# Patient Record
Sex: FEMALE | Race: WHITE | NOT HISPANIC OR LATINO | Employment: FULL TIME | URBAN - METROPOLITAN AREA
[De-identification: names, ages, dates, MRNs, and addresses within clinical notes are randomized per-mention and may not be internally consistent; named-entity substitution may affect disease eponyms.]

---

## 2019-01-16 ENCOUNTER — TRANSFERRED RECORDS (OUTPATIENT)
Dept: HEALTH INFORMATION MANAGEMENT | Facility: CLINIC | Age: 29
End: 2019-01-16

## 2020-08-26 ENCOUNTER — TRANSFERRED RECORDS (OUTPATIENT)
Dept: HEALTH INFORMATION MANAGEMENT | Facility: CLINIC | Age: 30
End: 2020-08-26

## 2021-02-21 ENCOUNTER — TRANSFERRED RECORDS (OUTPATIENT)
Dept: HEALTH INFORMATION MANAGEMENT | Facility: CLINIC | Age: 31
End: 2021-02-21

## 2021-09-13 ENCOUNTER — TELEPHONE (OUTPATIENT)
Dept: NEUROSURGERY | Facility: CLINIC | Age: 31
End: 2021-09-13

## 2021-09-13 NOTE — TELEPHONE ENCOUNTER
[facility] Call [facility]    Phone Message    May a detailed message be left on voicemail: yes     Reason for Call: Other: Patient is requesting a call back to get assistance with scheduling an appointment  for sural, please call patient at [phone] to assist.     Action Taken: Message routed to:  [facility]    Travel Screening: Not Applicable

## 2021-09-22 NOTE — TELEPHONE ENCOUNTER
[facility] Call [facility]    Phone Message    May a detailed message be left on voicemail: yes     Reason for Call: Appointment Intake    Referring Provider Name: None - Self  Diagnosis and/or Symptoms: Supraorbital trigeninal    Action Taken: Message routed to:  [facility] Neurosurgery    Travel Screening: Not Applicable

## 2021-09-27 ENCOUNTER — PRE VISIT (OUTPATIENT)
Dept: NEUROSURGERY | Facility: CLINIC | Age: 31
End: 2021-09-27

## 2021-09-27 NOTE — TELEPHONE ENCOUNTER
RECORDS RECEIVED FROM: Self   REASON FOR VISIT: Supraorbital trigeninal   Date of Appt: TBD   NOTES (FOR ALL VISITS) STATUS DETAILS   OFFICE NOTE from referring provider N/A    OFFICE NOTE from other specialist Received Dr Reza @ [facility]:  8/26/20 4/20/20 2/21/19 1/30/19 1/4/19   DISCHARGE SUMMARY from hospital N/A    DISCHARGE REPORT from the ER N/A    OPERATIVE REPORT N/A    MEDICATION LIST Received    IMAGING  (FOR ALL VISITS)     EMG N/A    EEG N/A    LUMBAR PUNCTURE N/A    GENARO SCAN N/A    ULTRASOUND (CAROTID BILAT) *VASCULAR* N/A    MRI (HEAD, NECK, SPINE) Received [facility]:  MRI Brain  1/16/19  MRA COW 1/16/19  MRI Cervical Spine 1/16/19  MRV Brain 1/16/19  MRA Carotids 1/16/19   CT (HEAD, NECK, SPINE) N/A       Action 9/27/21 MV 7.16am   Action Taken Imaging request faxed to [facility] for:  MRI Brain  1/16/19  MRA COW Carotid 1/16/19  MRI Cervical Spine 1/16/19    --9/30/21 MV 10.30am--  Images resolved in PACS

## 2021-09-27 NOTE — TELEPHONE ENCOUNTER
[facility] Call Center    Phone Message    May a detailed message be left on voicemail: yes     Reason for Call: Other: Lienna is requesting a call back to discuss the status of scheduling of an appointment. Please all Lienna at your earliest convenience to discuss.     Action Taken: Message routed to:  [facility]    Travel Screening: Not Applicable

## 2021-09-28 NOTE — TELEPHONE ENCOUNTER
Writer ARMIN for pt explaining that records and imaging have been requested and once those are received we will call to [place] Treviño

## 2021-10-01 NOTE — TELEPHONE ENCOUNTER
Writer spoke with pt and schedule an In person visit with Dr. Oliva for 11/9 and added pt to wait list    Georgina Treviño

## 2021-11-09 ENCOUNTER — LAB (OUTPATIENT)
Dept: LAB | Facility: CLINIC | Age: 31
End: 2021-11-09
Payer: COMMERCIAL

## 2021-11-09 ENCOUNTER — OFFICE VISIT (OUTPATIENT)
Dept: NEUROSURGERY | Facility: CLINIC | Age: 31
End: 2021-11-09
Payer: COMMERCIAL

## 2021-11-09 VITALS
HEIGHT: 67 IN | OXYGEN SATURATION: 97 % | DIASTOLIC BLOOD PRESSURE: 78 MMHG | RESPIRATION RATE: 16 BRPM | HEART RATE: 68 BPM | BODY MASS INDEX: 30.45 KG/M2 | SYSTOLIC BLOOD PRESSURE: 112 MMHG | WEIGHT: 194 LBS

## 2021-11-09 DIAGNOSIS — R51.9 FACIAL PAIN: Primary | ICD-10-CM

## 2021-11-09 DIAGNOSIS — R51.9 FACIAL PAIN: ICD-10-CM

## 2021-11-09 PROCEDURE — 86431 RHEUMATOID FACTOR QUANT: CPT | Mod: 90 | Performed by: PATHOLOGY

## 2021-11-09 PROCEDURE — 86039 ANTINUCLEAR ANTIBODIES (ANA): CPT | Mod: 90 | Performed by: PATHOLOGY

## 2021-11-09 PROCEDURE — 82164 ANGIOTENSIN I ENZYME TEST: CPT | Mod: 90 | Performed by: PATHOLOGY

## 2021-11-09 PROCEDURE — 99204 OFFICE O/P NEW MOD 45 MIN: CPT | Performed by: NEUROLOGICAL SURGERY

## 2021-11-09 PROCEDURE — 36415 COLL VENOUS BLD VENIPUNCTURE: CPT | Performed by: PATHOLOGY

## 2021-11-09 PROCEDURE — 86038 ANTINUCLEAR ANTIBODIES: CPT | Mod: 90 | Performed by: PATHOLOGY

## 2021-11-09 PROCEDURE — 86235 NUCLEAR ANTIGEN ANTIBODY: CPT | Mod: 90 | Performed by: PATHOLOGY

## 2021-11-09 PROCEDURE — 99000 SPECIMEN HANDLING OFFICE-LAB: CPT | Performed by: PATHOLOGY

## 2021-11-09 PROCEDURE — 86200 CCP ANTIBODY: CPT | Mod: 90 | Performed by: PATHOLOGY

## 2021-11-09 RX ORDER — CITALOPRAM HYDROBROMIDE 10 MG/1
10 TABLET ORAL DAILY
Qty: 30 TABLET | Refills: 3 | Status: SHIPPED | OUTPATIENT
Start: 2021-11-09 | End: 2022-07-19

## 2021-11-09 RX ORDER — GABAPENTIN 600 MG/1
1200 TABLET ORAL 4 TIMES DAILY
COMMUNITY
End: 2021-11-09

## 2021-11-09 RX ORDER — GABAPENTIN 600 MG/1
1200 TABLET ORAL 4 TIMES DAILY
Qty: 240 TABLET | Refills: 0 | Status: SHIPPED | OUTPATIENT
Start: 2021-11-09 | End: 2021-12-09

## 2021-11-09 ASSESSMENT — PAIN SCALES - GENERAL: PAINLEVEL: NO PAIN (0)

## 2021-11-09 ASSESSMENT — MIFFLIN-ST. JEOR: SCORE: 1609.67

## 2021-11-09 NOTE — LETTER
11/9/2021       RE: Wade Pozo  [address]  [place] [address]     Dear Colleague,    Thank you for referring your patient, Wade Pozo, to the [facility]UROSURGERY [facility]IC [place] at [facility]. Please see a copy of my visit note below.    Neurosurgery Clinic Note    Reason for Visit: facial pain    History of Present Illness  Wade Pozo is a 31-year-old woman with a past medical history of ADHD who began having facial pain a few years ago.  She states that it started as just an unusual sensation and in one of her eyebrows and groove to the contralateral side over a period of 2 years.  At the time she thought that this is most likely due to an extremely stressful job where she also had significant changes in her menstrual cycle.  She eventually quit this year and got pregnant.  Back in 2018 she was referred to neurologist for MRI which did not reveal any masses or signs of multiple sclerosis.  She was started on gabapentin which did significantly help her.  She started that 300 3 times daily but has needed escalating doses until she reached a maximum dose of 4800 mg/day or 1204 times a day.  She struggled with using gabapentin because she felt significant pain usually at the morning when she woke up after having last taken at all night so amitriptyline was added which also really helps.  Unfortunately she gained significant weight and has suffered from metabolic side effects of medications as well as the cognitive side effects of these medications.    When she found that she was pregnant she stopped the amitriptyline and while the pain has returned and has not been that bad.  She does have a much better job that is my understanding.  And now her goals are to continue to reduce medications but be able to function.  Unfortunately she feels lightheaded and high all day.  It makes her tired and feel lazy in  "combination with her ADHD for which she is not taking any medication at this time.    The pain spread to both sides of the nasion including eyebrows but not superiorly.  It also included a central portion of the nose and does radiate down into  V2.  She states that the pain is really a \"spicy burn\" or a hot burning with cramping.  She says that it feels like it is right on the surface and is not deep.  She notes that applying heat or massage to that area seems to really help.  Pushing on it feels good acutely but nothing seems to work for very long.  She tried acupuncture many years ago before it had really spread.    When she was younger she had significant episodes of joint pain that was evaluated for rheumatologic disease back in 2009.what was becoming progressively worse until she was discovered to have a gangrenous tooth.  After removal of the tooth her pain subsided and since then whenever she experiences significant and progressive joint pains or sciatica she has noticed that she has some stage of tooth decay.  She has never really had jaw pains per se and has been evaluated by an ENT but nothing was ever found.    Recently she has been suffering significantly from feelings of depression.  She is scoring a 17 on her PHQ-9 today and admits that she has no thoughts of hurting herself or plan to do self but is overall feeling more depressed.  She has tried to reach out to her psychiatrist to begin treatment but has been unable to do so.  He states that she has significant lethargy and impulsivity along with trouble sleeping and significant polyphagia        PMH: adhd    PSH: none, except teeth      Social History     Socioeconomic History     Marital status:      Spouse name: Not on file     Number of children: Not on file     Years of education: Not on file     Highest education level: Not on file   Occupational History     Not on file   Tobacco Use     Smoking status: Never Smoker     Smokeless tobacco: " "Never Used   Substance and Sexual Activity     Alcohol use: Not on file     Drug use: Not on file     Sexual activity: Not on file   Other Topics Concern     Not on file   Social History Narrative     Not on file            Allergies   Allergen Reactions     Amoxicillin        Current Outpatient Medications   Medication     Cholecalciferol (VITAMIN D3 PO)     gabapentin (NEURONTIN) 600 MG tablet     Multiple Vitamin (MULTIVITAMIN ADULT PO)     Prenatal Vit-Fe Fumarate-FA (PRENATAL VITAMIN PO)     No current facility-administered medications for this visit.             Physical Exam  /78   Pulse 68   Resp 16   Ht 1.689 m (5' 6.5\")   Wt 88 kg (194 lb)   LMP  (Exact Date)   SpO2 97%   BMI 30.84 kg/m        General: Awake, alert, oriented. Well nourished, well developed, no acute distress.  HEENT: Head normocephalic, atraumatic. No carotid bruit. Neck supple. Good range of motion. No palpable thyroid mass.  Heart: Regular rhythm and rate. No murmurs.  Lungs: Clear to auscultation and percussion bilaterally. No rhonchi, rales or wheeze.  Abdomen: Soft, non-tender, non-distended. No hepatosplenomegaly.  Extremity: Warm with no clubbing or cyanosis. No lower extremity edema.    Neurological  Awake, alert and oriented to date, time, place and person. Speech fluent.   Pupils equal, round, reactive to light.  Extraocular movement intact.  Facial sensation intact.  Face symmetric.  Tongue midline.  Uvula elevates equally.    Motor: full strength throughout.  Sensation: intact to light touch and pinpoint.      ROS: 10 point ROS neg other than the symptoms noted above in the HPI.        Imaging: my interpretations only  MRI brain and cervical spine from 2019: unrevealing and normal without specialized protocol for skull base         Assessment and Plan   Wade Pozo is a 31 year old female with  facial pain of unknown etiology.  Her pain is really constant and relatively progressing, effectively treated with " gabapentin though at high doses, and bilateral other unusual distribution at the nexus of V1 and V2.  She was previously diagnosed with bilateral trigeminal neuralgia which I do not believe to be her diagnosis at this time.  I think she needs a comprehensive evaluation for potential causes of trigeminal neuropathy and including appropriate imaging.  At this time she is pregnant so we will hold off on imaging and will obtain labs to evaluate the potential rheumatologic contribution.  I think she would benefit from consultation with the rest of the facial pain team and I will add her to our next clinic.  I think at this time we will continue her gabapentin despite his high dose and we will get her started on some citalopram 10 mg daily as she makes her way to her psychiatrist.  We discussed the role of medication during pregnancy and the potential risks to her fetus.  She understands these risks but would like to continue medical management at this time due to the significant amount of pain that has been debilitating.  When she is no longer pregnant we can continue her imaging work-up and touch base about her symptoms.      Add on to facial pain clinic      Gabapentin 1200 mg QID  Citalopram 10 mg      MRI after pregnancy    Hermelindo Oliva MD  Department of Neurosurgery  [facility]        Again, thank you for allowing me to participate in the care of your patient.      Sincerely,    Hermelindo Oliva MD

## 2021-11-09 NOTE — NURSING NOTE
Chief Complaint   Patient presents with     Consult     [facility] New - Supraorbital trigeninal     Depression Response    Patient completed the PHQ-9 assessment for depression and scored >9? Yes  Question 9 on the PHQ-9 was positive for suicidality? No  Does patient have current mental health provider? Yes    Is this a virtual visit? No    I personally notified the following: visit provider    Patient expressed an interest in a referral to a new mental health provider.    Jerzy Weber

## 2021-11-09 NOTE — LETTER
Date:December 30, 2021      Patient was self referred, no letter generated. Do not send.        [facility] Health Information

## 2021-11-09 NOTE — PROGRESS NOTES
"Neurosurgery Clinic Note    Reason for Visit: facial pain    History of Present Illness  Wade Pozo is a 31-year-old woman with a past medical history of ADHD who began having facial pain a few years ago.  She states that it started as just an unusual sensation and in one of her eyebrows and groove to the contralateral side over a period of 2 years.  At the time she thought that this is most likely due to an extremely stressful job where she also had significant changes in her menstrual cycle.  She eventually quit this year and got pregnant.  Back in 2018 she was referred to neurologist for MRI which did not reveal any masses or signs of multiple sclerosis.  She was started on gabapentin which did significantly help her.  She started that 300 3 times daily but has needed escalating doses until she reached a maximum dose of 4800 mg/day or 1204 times a day.  She struggled with using gabapentin because she felt significant pain usually at the morning when she woke up after having last taken at all night so amitriptyline was added which also really helps.  Unfortunately she gained significant weight and has suffered from metabolic side effects of medications as well as the cognitive side effects of these medications.    When she found that she was pregnant she stopped the amitriptyline and while the pain has returned and has not been that bad.  She does have a much better job that is my understanding.  And now her goals are to continue to reduce medications but be able to function.  Unfortunately she feels lightheaded and high all day.  It makes her tired and feel lazy in combination with her ADHD for which she is not taking any medication at this time.    The pain spread to both sides of the nasion including eyebrows but not superiorly.  It also included a central portion of the nose and does radiate down into  V2.  She states that the pain is really a \"spicy burn\" or a hot burning with cramping.  She says that " it feels like it is right on the surface and is not deep.  She notes that applying heat or massage to that area seems to really help.  Pushing on it feels good acutely but nothing seems to work for very long.  She tried acupuncture many years ago before it had really spread.    When she was younger she had significant episodes of joint pain that was evaluated for rheumatologic disease back in 2009.what was becoming progressively worse until she was discovered to have a gangrenous tooth.  After removal of the tooth her pain subsided and since then whenever she experiences significant and progressive joint pains or sciatica she has noticed that she has some stage of tooth decay.  She has never really had jaw pains per se and has been evaluated by an ENT but nothing was ever found.    Recently she has been suffering significantly from feelings of depression.  She is scoring a 17 on her PHQ-9 today and admits that she has no thoughts of hurting herself or plan to do self but is overall feeling more depressed.  She has tried to reach out to her psychiatrist to begin treatment but has been unable to do so.  He states that she has significant lethargy and impulsivity along with trouble sleeping and significant polyphagia        PMH: adhd    PSH: none, except teeth      Social History     Socioeconomic History     Marital status:      Spouse name: Not on file     Number of children: Not on file     Years of education: Not on file     Highest education level: Not on file   Occupational History     Not on file   Tobacco Use     Smoking status: Never Smoker     Smokeless tobacco: Never Used   Substance and Sexual Activity     Alcohol use: Not on file     Drug use: Not on file     Sexual activity: Not on file   Other Topics Concern     Not on file   Social History Narrative     Not on file            Allergies   Allergen Reactions     Amoxicillin        Current Outpatient Medications   Medication     Cholecalciferol  "(VITAMIN D3 PO)     gabapentin (NEURONTIN) 600 MG tablet     Multiple Vitamin (MULTIVITAMIN ADULT PO)     Prenatal Vit-Fe Fumarate-FA (PRENATAL VITAMIN PO)     No current facility-administered medications for this visit.             Physical Exam  /78   Pulse 68   Resp 16   Ht 1.689 m (5' 6.5\")   Wt 88 kg (194 lb)   LMP  (Exact Date)   SpO2 97%   BMI 30.84 kg/m        General: Awake, alert, oriented. Well nourished, well developed, no acute distress.  HEENT: Head normocephalic, atraumatic. No carotid bruit. Neck supple. Good range of motion. No palpable thyroid mass.  Heart: Regular rhythm and rate. No murmurs.  Lungs: Clear to auscultation and percussion bilaterally. No rhonchi, rales or wheeze.  Abdomen: Soft, non-tender, non-distended. No hepatosplenomegaly.  Extremity: Warm with no clubbing or cyanosis. No lower extremity edema.    Neurological  Awake, alert and oriented to date, time, place and person. Speech fluent.   Pupils equal, round, reactive to light.  Extraocular movement intact.  Facial sensation intact.  Face symmetric.  Tongue midline.  Uvula elevates equally.    Motor: full strength throughout.  Sensation: intact to light touch and pinpoint.      ROS: 10 point ROS neg other than the symptoms noted above in the HPI.        Imaging: my interpretations only  MRI brain and cervical spine from 2019: unrevealing and normal without specialized protocol for skull base         Assessment and Plan   Wade Pozo is a 31 year old female with  facial pain of unknown etiology.  Her pain is really constant and relatively progressing, effectively treated with gabapentin though at high doses, and bilateral other unusual distribution at the nexus of V1 and V2.  She was previously diagnosed with bilateral trigeminal neuralgia which I do not believe to be her diagnosis at this time.  I think she needs a comprehensive evaluation for potential causes of trigeminal neuropathy and including appropriate " imaging.  At this time she is pregnant so we will hold off on imaging and will obtain labs to evaluate the potential rheumatologic contribution.  I think she would benefit from consultation with the rest of the facial pain team and I will add her to our next clinic.  I think at this time we will continue her gabapentin despite his high dose and we will get her started on some citalopram 10 mg daily as she makes her way to her psychiatrist.  We discussed the role of medication during pregnancy and the potential risks to her fetus.  She understands these risks but would like to continue medical management at this time due to the significant amount of pain that has been debilitating.  When she is no longer pregnant we can continue her imaging work-up and touch base about her symptoms.      Add on to facial pain clinic      Gabapentin 1200 mg QID  Citalopram 10 mg      MRI after pregnancy    Hermelindo Oliva MD  Department of Neurosurgery  [facility]

## 2021-11-10 LAB
ACE SERPL-CCNC: 41 U/L
ANA PAT SER IF-IMP: ABNORMAL
ANA SER QL IF: POSITIVE
ANA TITR SER IF: ABNORMAL {TITER}
CCP AB SER IA-ACNC: 1.4 U/ML
ENA SS-A AB SER IA-ACNC: <0.5 U/ML
ENA SS-A AB SER IA-ACNC: NEGATIVE
ENA SS-B IGG SER IA-ACNC: 0.6 U/ML
ENA SS-B IGG SER IA-ACNC: NEGATIVE
RHEUMATOID FACT SER NEPH-ACNC: <7 IU/ML

## 2021-11-10 ASSESSMENT — PATIENT HEALTH QUESTIONNAIRE - PHQ9: SUM OF ALL RESPONSES TO PHQ QUESTIONS 1-9: 17

## 2021-12-09 DIAGNOSIS — R51.9 FACIAL PAIN: ICD-10-CM

## 2021-12-09 RX ORDER — GABAPENTIN 600 MG/1
1200 TABLET ORAL 4 TIMES DAILY
Qty: 240 TABLET | Refills: 2 | Status: SHIPPED | OUTPATIENT
Start: 2021-12-09 | End: 2022-02-03

## 2022-01-17 ENCOUNTER — APPOINTMENT (OUTPATIENT)
Dept: OTOLARYNGOLOGY | Facility: CLINIC | Age: 32
End: 2022-01-17
Payer: COMMERCIAL

## 2022-01-17 ENCOUNTER — VIRTUAL VISIT (OUTPATIENT)
Dept: OTOLARYNGOLOGY | Facility: CLINIC | Age: 32
End: 2022-01-17
Payer: COMMERCIAL

## 2022-01-17 VITALS — HEIGHT: 67 IN | BODY MASS INDEX: 31.71 KG/M2 | WEIGHT: 202 LBS

## 2022-01-17 DIAGNOSIS — G43.709 CHRONIC MIGRAINE WITHOUT AURA WITHOUT STATUS MIGRAINOSUS, NOT INTRACTABLE: ICD-10-CM

## 2022-01-17 DIAGNOSIS — R51.9 FACIAL PAIN: Primary | ICD-10-CM

## 2022-01-17 PROCEDURE — 99204 OFFICE O/P NEW MOD 45 MIN: CPT | Mod: GT | Performed by: PSYCHIATRY & NEUROLOGY

## 2022-01-17 PROCEDURE — 99203 OFFICE O/P NEW LOW 30 MIN: CPT | Mod: GT | Performed by: OTOLARYNGOLOGY

## 2022-01-17 PROCEDURE — 99207 PR NO DOCUMENTATION ON VISIT: CPT | Mod: GT | Performed by: NEUROLOGICAL SURGERY

## 2022-01-17 ASSESSMENT — MIFFLIN-ST. JEOR: SCORE: 1645.96

## 2022-01-17 ASSESSMENT — PAIN SCALES - GENERAL: PAINLEVEL: NO PAIN (0)

## 2022-01-17 NOTE — LETTER
1/17/2022       RE: Wade Pozo  [address]  [place] [address]     Dear Colleague,    Thank you for referring your patient, Wade Pozo, to the [facility] [place] [facility]. Please see a copy of my visit note below.    Wade is a 31 year old who is being evaluated via a billable video visit.      How would you like to obtain your AVS? MyChart  If the video visit is dropped, the invitation should be resent by: Send to e-mail at: [email or web address]  Will anyone else be joining your video visit? No    Video Start Time: 3:08PM  Video-Visit Details    Type of service:  Video Visit    Video End Time: 3:47PM    Originating Location (pt. Location): Home    Distant Location (provider location):  [facility] [place]     Platform used for Video Visit: [employer]        [employer]    Wade Pozo MRN# [id number]   Age: 31 year old YOB: 1990     Requesting physician: Referred Self  No primary care provider on file.            Assessment and Plan:     (R51.9) Facial pain  (primary encounter diagnosis)  Comment: The patient has bilateral facial pain that does not fit with trigeminal neuralgia character.  Etiology is unclear but certainly seems to be driven by some stress in the past.  It could be a migraine variant of some sort.  At this time she is actually managing with gabapentin so no surgical interventions are recommended.  She is currently pregnant she is taking a prenatal multivitamin with adequate folic acid supplementation.  She can follow-up with me after pregnancy and we can discuss alternate therapies.  She might be a good candidate for Botox if her examination does confirm chronic migraine.      On the day of the visit I spent 45 minutes caring for this patient including record review and video visit.    Indira Wadsworth MD            History of  Present Illness:   CC: The patient is seen in complex facial pain clinic which is a multidisciplinary clinic today attending is myself, Dr. Easley, Dr Tucker, Ms Gin Crawford for this appointment.    Wade is a 31-year-old woman who presents for evaluation of facial pain.  She reports it started over her right eyebrow in October 2016.  She felt as if something was stuck in the eyebrow such as a bug.  It then spread over to the middle of her forehead and now is also in the left eyebrow and running down the sides of the nose.  She describes it as a spicy jalapenos sensory disturbance.  It is there all the time.  If she takes gabapentin she can keep the pain under control amitriptyline also helps.  Heat also feels helpful to relieve the pain.  She is always had a lot of rhinorrhea she does not think it is any worse or correlated with the pain.  There is been no rash or redness associated with this pain.  There is no surface triggers such as touching for the pain.    She is currently pregnant with her first child.    She reports that when the pain started she was under a lot of stress due to very demanding job.  She has noticed subsequently that stress makes the pain worse.  She has changed her work position to a more manageable position and does think that is helped as well.    MRI scan of the brain showed an incidental left cerebellar stroke that really of unclear etiology..  Ear nose and throat evaluation was normal.  No nasal trauma as far she can recollect.  She has had rheumatologic work-up in the past that showed a mildly positive LESLEY with speckled pattern.             Physical Exam:     On video physical examination is limited but the patient appears to be awake and alert.  She is oriented.  She is a good historian with no aphasia or dysarthria.  Facial movement and expression appears normal bilaterally.         Pertinent History/Data for appointment:     Component      Latest Ref Rng & Units 11/9/2021    LESLEY interpretation      Negative Positive (A)   LESLEY pattern 1       Speckled   LESLEY titer 1       1:320   SSA Tarah IgG Instrument Value      <7.0 U/mL <0.5   SSA (Ro) Antibody IgG      Negative Negative   SSB Tarah IgG Instrument Value      <7.0 U/mL 0.6   SSB (La) Antibody IgG      Negative Negative   Rheumatoid Factor      <20 IU/mL <7   Cyclic Citrullinated Peptide Antibody, IgG      <7.0 U/mL 1.4   Angiotensin Converting Enzyme      9 - 67 U/L 41       Past medical history is significant migraines in her childhood that were different pain characteristics the knees.      Again, thank you for allowing me to participate in the care of your patient.      Sincerely,    Indira Wadsworth MD

## 2022-01-17 NOTE — PROGRESS NOTES
Wade is a 31 year old who is being evaluated via a billable video visit.      How would you like to obtain your AVS? MyChart  If the video visit is dropped, the invitation should be resent by: Send to e-mail at: [email or web address]  Will anyone else be joining your video visit? No    Video Start Time: 3:08PM  Video-Visit Details    Type of service:  Video Visit    Video End Time: 3:47PM    Originating Location (pt. Location): Home    Distant Location (provider location):  [facility] [place]     Platform used for Video Visit: [employer]    Wade Pozo MRN# [id number]   Age: 31 year old YOB: 1990     Requesting physician: Referred Self  No primary care provider on file.            Assessment and Plan:     (R51.9) Facial pain  (primary encounter diagnosis)  Comment: The patient has bilateral facial pain that does not fit with trigeminal neuralgia character.  Etiology is unclear but certainly seems to be driven by some stress in the past.  It could be a migraine variant of some sort.  At this time she is actually managing with gabapentin so no surgical interventions are recommended.  She is currently pregnant she is taking a prenatal multivitamin with adequate folic acid supplementation.  She can follow-up with me after pregnancy and we can discuss alternate therapies.  She might be a good candidate for Botox if her examination does confirm chronic migraine.      On the day of the visit I spent 45 minutes caring for this patient including record review and video visit.    Indira Wadsworth MD            History of Present Illness:   CC: The patient is seen in complex facial pain clinic which is a multidisciplinary clinic today attending is myself, Dr. Easley, Dr Tucker, Ms Crawford, Gin Jack for this appointment.    Wade is a 31-year-old woman who presents for evaluation of facial pain.  She reports it started over her right eyebrow in  October 2016.  She felt as if something was stuck in the eyebrow such as a bug.  It then spread over to the middle of her forehead and now is also in the left eyebrow and running down the sides of the nose.  She describes it as a spicy jalapenos sensory disturbance.  It is there all the time.  If she takes gabapentin she can keep the pain under control amitriptyline also helps.  Heat also feels helpful to relieve the pain.  She is always had a lot of rhinorrhea she does not think it is any worse or correlated with the pain.  There is been no rash or redness associated with this pain.  There is no surface triggers such as touching for the pain.    She is currently pregnant with her first child.    She reports that when the pain started she was under a lot of stress due to very demanding job.  She has noticed subsequently that stress makes the pain worse.  She has changed her work position to a more manageable position and does think that is helped as well.    MRI scan of the brain showed an incidental left cerebellar stroke that really of unclear etiology..  Ear nose and throat evaluation was normal.  No nasal trauma as far she can recollect.  She has had rheumatologic work-up in the past that showed a mildly positive LESLEY with speckled pattern.             Physical Exam:     On video physical examination is limited but the patient appears to be awake and alert.  She is oriented.  She is a good historian with no aphasia or dysarthria.  Facial movement and expression appears normal bilaterally.         Pertinent History/Data for appointment:     Component      Latest Ref Rng & Units 11/9/2021   LESLEY interpretation      Negative Positive (A)   LESLEY pattern 1       Speckled   LESLEY titer 1       1:320   SSA Tarah IgG Instrument Value      <7.0 U/mL <0.5   SSA (Ro) Antibody IgG      Negative Negative   SSB Tarah IgG Instrument Value      <7.0 U/mL 0.6   SSB (La) Antibody IgG      Negative Negative   Rheumatoid Factor      <20  IU/mL <7   Cyclic Citrullinated Peptide Antibody, IgG      <7.0 U/mL 1.4   Angiotensin Converting Enzyme      9 - 67 U/L 41       Past medical history is significant migraines in her childhood that were different pain characteristics the knees.

## 2022-01-17 NOTE — NURSING NOTE
"Chief Complaint   Patient presents with     Consult     referred by Dr. Oliva, pt confirmed video visit, wants link emailed to [email or web address]    Height 1.689 m (5' 6.5\"), weight 91.6 kg (202 lb). Veronica Anderson, EMT  "

## 2022-01-17 NOTE — LETTER
Date:January 25, 2022      Patient was self referred, no letter generated. Do not send.        [facility] Health Information

## 2022-01-17 NOTE — LETTER
Date:January 21, 2022      Patient was self referred, no letter generated. Do not send.        [facility] Health Information

## 2022-01-17 NOTE — LETTER
1/17/2022       RE: Wade Pozo  [address]  [place] [facility] [address]     Dear Colleague,    Thank you for referring your patient, Wade Pozo, to the [facility] EAR NOSE AND THROAT CLINIC [place] at [facility]. Please see a copy of my visit note below.    See dictation      Again, thank you for allowing me to participate in the care of your patient.      Sincerely,    Ketan Easley MD

## 2022-01-17 NOTE — LETTER
2022       RE: Wade Pozo  [address]  [place] [facility] [address]     Dear Colleague,    Thank you for referring your patient, Wade Pozo, to the [facility] EAR NOSE AND THROAT CLINIC [place] at [facility]. Please see a copy of my visit note below.    Wade is a 31 year old who is being evaluated via a billable video visit.      How would you like to obtain your AVS? MyChart  If the video visit is dropped, the invitation should be resent by: Send to e-mail at: [email or web address]  Will anyone else be joining your video visit? No      Video Start Time: 3:07  Video-Visit Details    Type of service:  Video Visit    Video End Time:3:36    Originating Location (pt. Location): Home    Distant Location (provider location):  [facility] E[facility] NOSE AND THROAT CLINIC [place]     Platform used for Video Visit: Zoom      Facial Pain Adult Consultation    Patient: Wade Pozo   : 1990     Patient presents with:  Consult: No chief complaint on file.       Referring Provider: Referred Self   Consulting Physician:  Maricruz Tucker MD       Assessment/Plan: Patient was discussed by multidisciplinary team. We discussed the risks and benefits of her current therapy.  It was felt that the most likely etiology for her symptoms is neuropathic pain. She will continue gabapentin through pregnancy, and will see Dr. Wadsworth after delivery to consider other treatment modalities such as nerve block, botox, and migraine medications     HPI: Wade Pozo is a 31 year old female seen today in the Multidisciplinary Facial Pain Clinic in for a history of pain over both eyebrows.  Symptoms include persistent surface level pain and tightness over both eyebrows. Describes pain as spicy and burning. Duration of symptoms has been 2 years. Previous medical therapies tried and their effects include gabapentin with benefit, albeit high doses. She  does experience some side effects. Also benefited from amitriptyline, but stopped that when she became pregnant. She denies facial or nasal trauma or surgery. She has seen ENT before. MRI did show septal deviation per report. She does have a history of migraines, ADHD. She tried chiropractic treatment. She feels the pain is worse with work related stress. Labs drawn for rheumatologic work up by Dr. Oliva were non revealing.     She presents to discuss assessment and treatment.     Cholecalciferol (VITAMIN D3 PO), Take by mouth daily  citalopram (CELEXA) 10 MG tablet, Take 1 tablet (10 mg) by mouth daily (Patient not taking: Reported on 1/17/2022)  gabapentin (NEURONTIN) 600 MG tablet, Take 2 tablets (1,200 mg) by mouth 4 times daily  Multiple Vitamin (MULTIVITAMIN ADULT PO),   Prenatal Vit-Fe Fumarate-FA (PRENATAL VITAMIN PO),     No current facility-administered medications on file prior to visit.         Allergies   Allergen Reactions     Amoxicillin        Past Medical History:   Diagnosis Date     ADHD (attention deficit hyperactivity disorder)      Migraine without aura and without status migrainosus, not intractable        Social History     Occupational History     Not on file   Tobacco Use     Smoking status: Never Smoker     Smokeless tobacco: Never Used   Substance and Sexual Activity     Alcohol use: Not on file     Drug use: Not on file     Sexual activity: Not on file        Review of Systems  No flowsheet data found.     14 point ROS neg other than the symptoms noted above.    GENERAL: Healthy, alert and no distress  EYES: Eyes grossly normal to inspection.  No discharge or erythema, or obvious scleral/conjunctival abnormalities.  RESP: No audible wheeze, cough, or visible cyanosis.  No visible retractions or increased work of breathing.    SKIN: Visible skin clear. No significant rash, abnormal pigmentation or lesions.  NEURO: Cranial nerves grossly intact.  Mentation and speech appropriate for  age.  PSYCH: Mentation appears normal, affect normal/bright, judgement and insight intact, normal speech and appearance well-groomed.    35 minutes spent on the date of the encounter doing chart review, history and exam, documentation and further activities per the note               Again, thank you for allowing me to participate in the care of your patient.      Sincerely,    Maricruz Tucker MD

## 2022-01-20 NOTE — PROGRESS NOTES
Wade is a 31 year old who is being evaluated via a billable video visit.      How would you like to obtain your AVS? MyChart  If the video visit is dropped, the invitation should be resent by: Send to e-mail at: [email or web address]  Will anyone else be joining your video visit? No      Video Start Time: 3:07  Video-Visit Details    Type of service:  Video Visit    Video End Time:3:36    Originating Location (pt. Location): Home    Distant Location (provider location):  [facility] EAR NOSE AND THROAT [facility]     Platform used for Video Visit: Zoom      Facial Pain Adult Consultation    Patient: Wade Pozo   : 1990     Patient presents with:  Consult: No chief complaint on file.       Referring Provider: Referred Self   Consulting Physician:  Maricruz Tucker MD       Assessment/Plan: Patient was discussed by multidisciplinary team. We discussed the risks and benefits of her current therapy.  It was felt that the most likely etiology for her symptoms is neuropathic pain. She will continue gabapentin through pregnancy, and will see Dr. Wadsworth after delivery to consider other treatment modalities such as nerve block, botox, and migraine medications     HPI: Wade Pozo is a 31 year old female seen today in the Multidisciplinary Facial Pain Clinic in for a history of pain over both eyebrows.  Symptoms include persistent surface level pain and tightness over both eyebrows. Describes pain as spicy and burning. Duration of symptoms has been 2 years. Previous medical therapies tried and their effects include gabapentin with benefit, albeit high doses. She does experience some side effects. Also benefited from amitriptyline, but stopped that when she became pregnant. She denies facial or nasal trauma or surgery. She has seen ENT before. MRI did show septal deviation per report. She does have a history of migraines, ADHD. She tried chiropractic treatment. She feels the pain is  worse with work related stress. Labs drawn for rheumatologic work up by Dr. Oliva were non revealing.     She presents to discuss assessment and treatment.     Cholecalciferol (VITAMIN D3 PO), Take by mouth daily  citalopram (CELEXA) 10 MG tablet, Take 1 tablet (10 mg) by mouth daily (Patient not taking: Reported on 1/17/2022)  gabapentin (NEURONTIN) 600 MG tablet, Take 2 tablets (1,200 mg) by mouth 4 times daily  Multiple Vitamin (MULTIVITAMIN ADULT PO),   Prenatal Vit-Fe Fumarate-FA (PRENATAL VITAMIN PO),     No current facility-administered medications on file prior to visit.         Allergies   Allergen Reactions     Amoxicillin        Past Medical History:   Diagnosis Date     ADHD (attention deficit hyperactivity disorder)      Migraine without aura and without status migrainosus, not intractable        Social History     Occupational History     Not on file   Tobacco Use     Smoking status: Never Smoker     Smokeless tobacco: Never Used   Substance and Sexual Activity     Alcohol use: Not on file     Drug use: Not on file     Sexual activity: Not on file        Review of Systems  No flowsheet data found.     14 point ROS neg other than the symptoms noted above.    GENERAL: Healthy, alert and no distress  EYES: Eyes grossly normal to inspection.  No discharge or erythema, or obvious scleral/conjunctival abnormalities.  RESP: No audible wheeze, cough, or visible cyanosis.  No visible retractions or increased work of breathing.    SKIN: Visible skin clear. No significant rash, abnormal pigmentation or lesions.  NEURO: Cranial nerves grossly intact.  Mentation and speech appropriate for age.  PSYCH: Mentation appears normal, affect normal/bright, judgement and insight intact, normal speech and appearance well-groomed.    35 minutes spent on the date of the encounter doing chart review, history and exam, documentation and further activities per the note

## 2022-01-22 ENCOUNTER — HEALTH MAINTENANCE LETTER (OUTPATIENT)
Age: 32
End: 2022-01-22

## 2022-02-02 ENCOUNTER — TELEPHONE (OUTPATIENT)
Dept: NEUROLOGY | Facility: CLINIC | Age: 32
End: 2022-02-02
Payer: COMMERCIAL

## 2022-02-02 DIAGNOSIS — R51.9 FACIAL PAIN: ICD-10-CM

## 2022-02-02 NOTE — TELEPHONE ENCOUNTER
[facility] Call Center    Phone Message    May a detailed message be left on voicemail: yes     Reason for Call: Medication Refill Request    Has the patient contacted the pharmacy for the refill? Yes   Name of medication being requested: gabapentin (NEURONTIN) 600 MG tablet  Provider who prescribed the medication:    Pharmacy: [id number] [id number] [place] [address]  Date medication is needed: ASAP    Patient called asking for PA status her pharmacy sent, per pt pharm have not heard back from clinic. Pt ran out on Monday, needing refills asap. Please call back to update. [phone]        Action Taken: Message routed to:  [facility] ([facility] neurology    Travel Screening: Not Applicable

## 2022-02-02 NOTE — TELEPHONE ENCOUNTER
Patient calling back to check on status, per patient care team can call pharmacy for verbal orders. Pharmacy p# [phone]

## 2022-02-03 RX ORDER — GABAPENTIN 600 MG/1
1200 TABLET ORAL 4 TIMES DAILY
Qty: 240 TABLET | Refills: 2 | Status: SHIPPED | OUTPATIENT
Start: 2022-02-03 | End: 2022-07-19

## 2022-02-03 NOTE — TELEPHONE ENCOUNTER
Per Dr Oliva OV note on 11/9/21 patient will continue Gabapentin    I think at this time we will continue her gabapentin despite his high dose and we will get her started on some citalopram 10 mg daily as she makes her way to her psychiatrist.  We discussed the role of medication during pregnancy and the potential risks to her fetus.  She understands these risks but would like to continue medical management at this time due to the significant amount of pain that has been debilitating.    Medication refilled.

## 2022-07-19 ENCOUNTER — OFFICE VISIT (OUTPATIENT)
Dept: NEUROLOGY | Facility: CLINIC | Age: 32
End: 2022-07-19
Payer: COMMERCIAL

## 2022-07-19 VITALS — SYSTOLIC BLOOD PRESSURE: 118 MMHG | HEART RATE: 70 BPM | DIASTOLIC BLOOD PRESSURE: 84 MMHG | OXYGEN SATURATION: 97 %

## 2022-07-19 DIAGNOSIS — G43.719 INTRACTABLE CHRONIC MIGRAINE WITHOUT AURA AND WITHOUT STATUS MIGRAINOSUS: Primary | ICD-10-CM

## 2022-07-19 DIAGNOSIS — R51.9 FACIAL PAIN: ICD-10-CM

## 2022-07-19 PROCEDURE — 99215 OFFICE O/P EST HI 40 MIN: CPT | Performed by: PSYCHIATRY & NEUROLOGY

## 2022-07-19 RX ORDER — DEXTROAMPHETAMINE SACCHARATE, AMPHETAMINE ASPARTATE, DEXTROAMPHETAMINE SULFATE AND AMPHETAMINE SULFATE 2.5; 2.5; 2.5; 2.5 MG/1; MG/1; MG/1; MG/1
TABLET ORAL DAILY PRN
COMMUNITY
Start: 2022-07-06

## 2022-07-19 RX ORDER — GABAPENTIN 600 MG/1
1200 TABLET ORAL 4 TIMES DAILY
Qty: 240 TABLET | Refills: 2 | Status: SHIPPED | OUTPATIENT
Start: 2022-07-19 | End: 2022-10-20

## 2022-07-19 ASSESSMENT — PAIN SCALES - GENERAL: PAINLEVEL: MILD PAIN (2)

## 2022-07-19 NOTE — LETTER
Date:July 21, 2022      Provider requested that no letter be sent. Do not send.       [facility]

## 2022-07-19 NOTE — PROGRESS NOTES
[facility]    Wade Pozo MRN# [id number]   Age: 32 year old YOB: 1990     Requesting physician: Referred Self  No primary care provider on file.            Assessment and Plan:     (G43.719) Intractable chronic migraine without aura and without status migrainosus  (primary encounter diagnosis)  (R51.9) Facial pain  Comment: The patient has an atypical presentation of facial pain that may be related to chronic migraine considering the light sensitivity sound sensitivity and feeling better when not moving symptoms.  In that setting she has failed numerous medications and so we would like to pursue Botox.  She is not currently breast-feeding so there is clearly no contraindication  Plan:   1. Botulinum Toxin Type A (BOTOX) 200 units injection 155 Units  2.  Continue gabapentin (NEURONTIN) 600MG tablet for now but hopefully we can taper to a much lower dose in the future          Once approved for Botox I will try and work her in for rapid injection that way we can start working on decreasing her gabapentin dose.    40 minutes spent caring for the patient today.  30 minutes of that was the appointment time and then also 10 minutes of coordinating care to see if we could possibly inject Botox on the same day.  Unfortunately it was not clear to us that insurance would approve it.    Indira Wadsworth MD           History of Present Illness:   CC: Job Bobo is a 32-year-old woman who was seen as part of complex facial pain clinic back in January.  At the time she was pregnant and so we chose not to really pursue any treatment but for her to follow-up here to discuss what treatment options she has.  She does have a past medical history significant for chronic migraine.  The pain right now is in the facial region it is atypical and not consistent with any clear diagnosis specifically its not consistent with trigeminal neuralgia due to the bilateral nature of the pain.    The patient reports  the pain has not changed during pregnancy.  She is taking a high dose of gabapentin taking 1200 mg 3 or 4 times a day.  She can feel the medicine wear off and then takes another dose.  The pain starts in the corner of both eyebrows medially and works its way laterally as well as down her nose.  When it is most severe it does cause her to feel sensitive to light and sound and she feels much more comfortable she is laying on a couch not moving.    She gave birth 6 weeks ago to a healthy baby girl.  She does feel since that time that her equilibrium has been slightly off.      Has previously tried and failed amitriptyline, acupuncture, chiropractor.  ADHD diagnosis makes topiramate choice poor due to cognitive side effects.              Physical Exam:     /84   Pulse 70   SpO2 97%   Breastfeeding Unknown The patient is not breast-feeding  The patient is awake and alert.  She is oriented.  Facial symmetry appears normal.  Facial sensation to light touch is normal.           Pertinent History/Data for appointment:     MRI brain in 2019 suggested possible stroke.

## 2022-07-19 NOTE — LETTER
7/19/2022       RE: Wade Pozo  [address]  [place] [facility] [address]     Dear Colleague,    Thank you for referring your patient, Wade Pozo, to the [facility] NEUROLOGY CLINIC [place] at [facility]. Please see a copy of my visit note below.        [facility]    Wade Pozo MRN# [id number]   Age: 32 year old YOB: 1990     Requesting physician: Referred Self  No primary care provider on file.            Assessment and Plan:     (G43.719) Intractable chronic migraine without aura and without status migrainosus  (primary encounter diagnosis)  (R51.9) Facial pain  Comment: The patient has an atypical presentation of facial pain that may be related to chronic migraine considering the light sensitivity sound sensitivity and feeling better when not moving symptoms.  In that setting she has failed numerous medications and so we would like to pursue Botox.  She is not currently breast-feeding so there is clearly no contraindication  Plan:   1. Botulinum Toxin Type A (BOTOX) 200 units injection 155 Units  2.  Continue gabapentin (NEURONTIN) 600MG tablet for now but hopefully we can taper to a much lower dose in the future          Once approved for Botox I will try and work her in for rapid injection that way we can start working on decreasing her gabapentin dose.    40 minutes spent caring for the patient today.  30 minutes of that was the appointment time and then also 10 minutes of coordinating care to see if we could possibly inject Botox on the same day.  Unfortunately it was not clear to us that insurance would approve it.    Indira Wadsworth MD           History of Present Illness:   CC: Job Bobo is a 32-year-old woman who was seen as part of complex facial pain clinic back in January.  At the time she was pregnant and so we chose not to really pursue any treatment but for her to follow-up here to discuss what  treatment options she has.  She does have a past medical history significant for chronic migraine.  The pain right now is in the facial region it is atypical and not consistent with any clear diagnosis specifically its not consistent with trigeminal neuralgia due to the bilateral nature of the pain.    The patient reports the pain has not changed during pregnancy.  She is taking a high dose of gabapentin taking 1200 mg 3 or 4 times a day.  She can feel the medicine wear off and then takes another dose.  The pain starts in the corner of both eyebrows medially and works its way laterally as well as down her nose.  When it is most severe it does cause her to feel sensitive to light and sound and she feels much more comfortable she is laying on a couch not moving.    She gave birth 6 weeks ago to a healthy baby girl.  She does feel since that time that her equilibrium has been slightly off.      Has previously tried and failed amitriptyline, acupuncture, chiropractor.  ADHD diagnosis makes topiramate choice poor due to cognitive side effects.              Physical Exam:     /84   Pulse 70   SpO2 97%   Breastfeeding Unknown The patient is not breast-feeding  The patient is awake and alert.  She is oriented.  Facial symmetry appears normal.  Facial sensation to light touch is normal.           Pertinent History/Data for appointment:     MRI brain in 2019 suggested possible stroke.       Again, thank you for allowing me to participate in the care of your patient.      Sincerely,    Indira Wadsworth MD

## 2022-08-24 ENCOUNTER — CARE COORDINATION (OUTPATIENT)
Dept: NEUROLOGY | Facility: CLINIC | Age: 32
End: 2022-08-24

## 2022-08-24 NOTE — PROGRESS NOTES
Documentation for insurance:     Patient has daily headaches 30/30 per month. Her headaches last all day/everyday.     Caro BARR

## 2022-09-13 ENCOUNTER — OFFICE VISIT (OUTPATIENT)
Dept: NEUROLOGY | Facility: CLINIC | Age: 32
End: 2022-09-13
Payer: COMMERCIAL

## 2022-09-13 DIAGNOSIS — G43.719 INTRACTABLE CHRONIC MIGRAINE WITHOUT AURA AND WITHOUT STATUS MIGRAINOSUS: Primary | ICD-10-CM

## 2022-09-13 PROCEDURE — 64615 CHEMODENERV MUSC MIGRAINE: CPT | Performed by: PSYCHIATRY & NEUROLOGY

## 2022-09-13 NOTE — LETTER
9/13/2022       RE: Wade Pozo  [address]  [facility] [address]     Dear Colleague,    Thank you for referring your patient, Wade Pozo, to the [facility] NEUROLOGY CLINIC [place] at [facility]. Please see a copy of my visit note below.         Gene Pozo MRN# [id number]   Age: 32 year old YOB: 1990       Botulinum Toxin Procedure Note      The procedure was explained to the patient. Benefits of the treatment were discussed including headache and migraine reduction. Risks of the procedure were reviewed including but not limited to pain, bruising, bleeding, infection, weakness of muscles injected or those distal to injection. The patient voiced understanding of the risks and benefits. All questions answered and the patient consented to proceed.     Prior to receiving Botox injections the patient reported the following:  Baseline headache frequency: 30 days a month duration variable minutes to hours  Atypical facial pain felt to be a migraine variant after discussion in complex facial pain clinic.  Associated migrainous features: light sensitivity, worse with movement, triggered by stress    Previous treatment trials: gabapentin, amitriptyline, topiramate,acupuncture, chiropractor    Last Botox procedure: this is first one  Current migraine frequency: see above  Current migraine duration: see above    A 200 unit vial of Botox was diluted with 4ml of 0.9% sodium chloride    Botox lot # and expiration date: See MAR for details  0.9% sodium chloride lot # and expiration date: See MAR for details    The following muscles were injected using a 30 gauge needle:  Procerus 1 site 5 units   2 sites (bilat) 10 units  Frontalis 4 sites (bilat) 20 units  Temporalis 8 sites (bilat) 40 units  Trapezius muscles 6 sites (bilat) 30 units  Cervical paraspinals (bilat) 4 sites 20 units  Occipitalis 6 sites (bilat)  30 units    A total of 155 units were injected, 45 units wasted.   The patient tolerated the injections well. I was present for and performed the entire procedure.     Indira Wadsworth MD        Again, thank you for allowing me to participate in the care of your patient.      Sincerely,    Indira Wadsworth MD

## 2022-09-13 NOTE — PROGRESS NOTES
LOUISA Olvera ROBIN Pozo MRN# [id number]   Age: 32 year old YOB: 1990       Botulinum Toxin Procedure Note      The procedure was explained to the patient. Benefits of the treatment were discussed including headache and migraine reduction. Risks of the procedure were reviewed including but not limited to pain, bruising, bleeding, infection, weakness of muscles injected or those distal to injection. The patient voiced understanding of the risks and benefits. All questions answered and the patient consented to proceed.     Prior to receiving Botox injections the patient reported the following:  Baseline headache frequency: 30 days a month duration variable minutes to hours  Atypical facial pain felt to be a migraine variant after discussion in complex facial pain clinic.  Associated migrainous features: light sensitivity, worse with movement, triggered by stress    Previous treatment trials: gabapentin, amitriptyline, topiramate,acupuncture, chiropractor    Last Botox procedure: this is first one  Current migraine frequency: see above  Current migraine duration: see above    A 200 unit vial of Botox was diluted with 4ml of 0.9% sodium chloride    Botox lot # and expiration date: See MAR for details  0.9% sodium chloride lot # and expiration date: See MAR for details    The following muscles were injected using a 30 gauge needle:  Procerus 1 site 5 units   2 sites (bilat) 10 units  Frontalis 4 sites (bilat) 20 units  Temporalis 8 sites (bilat) 40 units  Trapezius muscles 6 sites (bilat) 30 units  Cervical paraspinals (bilat) 4 sites 20 units  Occipitalis 6 sites (bilat) 30 units    A total of 155 units were injected, 45 units wasted.   The patient tolerated the injections well. I was present for and performed the entire procedure.     Indira Wadsworth MD

## 2022-09-13 NOTE — LETTER
Date:September 13, 2022      Provider requested that no letter be sent. Do not send.       [facility]

## 2022-10-19 DIAGNOSIS — G43.719 INTRACTABLE CHRONIC MIGRAINE WITHOUT AURA AND WITHOUT STATUS MIGRAINOSUS: ICD-10-CM

## 2022-10-19 DIAGNOSIS — R51.9 FACIAL PAIN: ICD-10-CM

## 2022-10-20 RX ORDER — GABAPENTIN 600 MG/1
1200 TABLET ORAL 4 TIMES DAILY
Qty: 240 TABLET | Refills: 2 | Status: SHIPPED | OUTPATIENT
Start: 2022-10-20 | End: 2023-05-09

## 2022-10-20 NOTE — TELEPHONE ENCOUNTER
Rx Authorization:    Requested Medication/ Dosegabapentin (NEURONTIN) 600 MG tablet    Date last refill ordered: 7/19/22    Quantity ordered: 240    # refills: 2    Date of last clinic visit with ordering provider: 9/13/22    Date of next clinic visit with ordering provider:     All pertinent protocol data (lab date/result):     Include pertinent information from patients message:

## 2022-12-06 ENCOUNTER — OFFICE VISIT (OUTPATIENT)
Dept: NEUROLOGY | Facility: CLINIC | Age: 32
End: 2022-12-06
Payer: COMMERCIAL

## 2022-12-06 DIAGNOSIS — G43.719 INTRACTABLE CHRONIC MIGRAINE WITHOUT AURA AND WITHOUT STATUS MIGRAINOSUS: Primary | ICD-10-CM

## 2022-12-06 PROCEDURE — 64615 CHEMODENERV MUSC MIGRAINE: CPT | Performed by: PSYCHIATRY & NEUROLOGY

## 2022-12-06 NOTE — LETTER
12/6/2022       RE: Wade Pozo  [address]  [facility] [address]     Dear Colleague,    Thank you for referring your patient, Wade Pozo, to the [facility] NEUROLOGY CLINIC [place] at [facility]. Please see a copy of my visit note below.       [facility]     Wade Pozo MRN# [id number]   Age: 32 year old YOB: 1990         Botulinum Toxin Procedure Note        The procedure was explained to the patient. Benefits of the treatment were discussed including headache and migraine reduction. Risks of the procedure were reviewed including but not limited to pain, bruising, bleeding, infection, weakness of muscles injected or those distal to injection. The patient voiced understanding of the risks and benefits. All questions answered and the patient consented to proceed.      Prior to receiving Botox injections the patient reported the following:  Baseline headache frequency: 30 days a month duration variable minutes to hours  Atypical facial pain felt to be a migraine variant after discussion in complex facial pain clinic.  Associated migrainous features: light sensitivity, worse with movement, triggered by stress     Previous treatment trials: gabapentin, amitriptyline, topiramate,acupuncture, chiropractor     Last Botox procedure: 9/13/2022  Current migraine frequency: Using much less gabapentin these days, not waking up in pain.  2-3 times a day instead of 3-4 a day. Still some pain there daily but not as severe, 50% reduction  Current migraine duration: minutes to hours     A 200 unit vial of Botox was diluted with 4ml of 0.9% sodium chloride     Botox lot # and expiration date: See MAR for details  0.9% sodium chloride lot # and expiration date: See MAR for details     The following muscles were injected using a 30 gauge needle:  Procerus 1 site 5 units   2 sites (bilat) 10 units  Frontalis 4 sites (bilat) 20  units  Temporalis 8 sites (bilat) 40 units  Trapezius muscles 6 sites (bilat) 30 units  Cervical paraspinals (bilat) 4 sites 20 units  Occipitalis 6 sites (bilat) 30 units     A total of 155 units were injected, 45 units wasted.   The patient tolerated the injections well. I was present for and performed the entire procedure.      Indira Wadsworth MD        Again, thank you for allowing me to participate in the care of your patient.      Sincerely,    Indira Wadsworth MD

## 2022-12-06 NOTE — PROGRESS NOTES
LOUISA KELLY Nohelia MRN# [id number]   Age: 32 year old YOB: 1990         Botulinum Toxin Procedure Note        The procedure was explained to the patient. Benefits of the treatment were discussed including headache and migraine reduction. Risks of the procedure were reviewed including but not limited to pain, bruising, bleeding, infection, weakness of muscles injected or those distal to injection. The patient voiced understanding of the risks and benefits. All questions answered and the patient consented to proceed.      Prior to receiving Botox injections the patient reported the following:  Baseline headache frequency: 30 days a month duration variable minutes to hours  Atypical facial pain felt to be a migraine variant after discussion in complex facial pain clinic.  Associated migrainous features: light sensitivity, worse with movement, triggered by stress     Previous treatment trials: gabapentin, amitriptyline, topiramate,acupuncture, chiropractor     Last Botox procedure: 9/13/2022  Current migraine frequency: Using much less gabapentin these days, not waking up in pain.  2-3 times a day instead of 3-4 a day. Still some pain there daily but not as severe, 50% reduction  Current migraine duration: minutes to hours     A 200 unit vial of Botox was diluted with 4ml of 0.9% sodium chloride     Botox lot # and expiration date: See MAR for details  0.9% sodium chloride lot # and expiration date: See MAR for details     The following muscles were injected using a 30 gauge needle:  Procerus 1 site 5 units   2 sites (bilat) 10 units  Frontalis 4 sites (bilat) 20 units  Temporalis 8 sites (bilat) 40 units  Trapezius muscles 6 sites (bilat) 30 units  Cervical paraspinals (bilat) 4 sites 20 units  Occipitalis 6 sites (bilat) 30 units     A total of 155 units were injected, 45 units wasted.   The patient tolerated the injections well. I was present for and performed the entire  procedure.      Indira Wadsworth MD

## 2023-01-14 ENCOUNTER — HEALTH MAINTENANCE LETTER (OUTPATIENT)
Age: 33
End: 2023-01-14

## 2023-01-27 ENCOUNTER — TRANSFERRED RECORDS (OUTPATIENT)
Dept: HEALTH INFORMATION MANAGEMENT | Facility: CLINIC | Age: 33
End: 2023-01-27
Payer: COMMERCIAL

## 2023-02-09 ENCOUNTER — TELEPHONE (OUTPATIENT)
Dept: NEUROLOGY | Facility: CLINIC | Age: 33
End: 2023-02-09
Payer: COMMERCIAL

## 2023-02-09 NOTE — TELEPHONE ENCOUNTER
Called [facility] at [phone] spoke to Mignon.  Requested Mignon to push pt's recent MRI imaging brain/orbits to  PACS. Informed her we need MRI report to. Fax # given.

## 2023-02-09 NOTE — TELEPHONE ENCOUNTER
Called pt and informed her we received her my chart message; however, we have not received any imaging from [facility].  Informed her I will contact [facility] to have Brain MRI imaging sent to [facility].  She stated the [facility] phone # is [phone].

## 2023-03-07 ENCOUNTER — OFFICE VISIT (OUTPATIENT)
Dept: NEUROLOGY | Facility: CLINIC | Age: 33
End: 2023-03-07
Payer: COMMERCIAL

## 2023-03-07 DIAGNOSIS — G43.719 INTRACTABLE CHRONIC MIGRAINE WITHOUT AURA AND WITHOUT STATUS MIGRAINOSUS: Primary | ICD-10-CM

## 2023-03-07 PROCEDURE — 64615 CHEMODENERV MUSC MIGRAINE: CPT | Performed by: PSYCHIATRY & NEUROLOGY

## 2023-03-07 NOTE — LETTER
Date:March 7, 2023      Provider requested that no letter be sent. Do not send.       [facility]

## 2023-03-07 NOTE — PROGRESS NOTES
LOUISA KELLY Juaggie MRN# [id number]   Age: 32 year old YOB: 1990         Botulinum Toxin Procedure Note        The procedure was explained to the patient. Benefits of the treatment were discussed including headache and migraine reduction. Risks of the procedure were reviewed including but not limited to pain, bruising, bleeding, infection, weakness of muscles injected or those distal to injection. The patient voiced understanding of the risks and benefits. All questions answered and the patient consented to proceed.      Prior to receiving Botox injections the patient reported the following:  Baseline headache frequency: 30 days a month duration variable minutes to hours  Atypical facial pain felt to be a migraine variant after discussion in complex facial pain clinic.  Associated migrainous features: light sensitivity, worse with movement, triggered by stress     Previous treatment trials: gabapentin, amitriptyline, topiramate,acupuncture, chiropractor  Started taking naltrexone (2 weeks)     Last Botox procedure: 12/6/22  Current migraine frequency: still daily pain but this seems to allow her to reduce gabapentin usage and manage pain. Less disability related to reduced productivity.   Current migraine duration: minutes to hours     A 200 unit vial of Botox was diluted with 4ml of 0.9% sodium chloride     Botox lot # and expiration date: See MAR for details  0.9% sodium chloride lot # and expiration date: See MAR for details     The following muscles were injected using a 30 gauge needle:  Procerus 1 site 5 units   2 sites (bilat) 10 units  Frontalis 4 sites (bilat) 20 units  Temporalis 8 sites (bilat) 40 units  Trapezius muscles 6 sites (bilat) 30 units  Cervical paraspinals (bilat) 4 sites 20 units  Occipitalis 6 sites (bilat) 30 units     A total of 155 units were injected, 45 units wasted.   The patient tolerated the injections well. I was present for and performed  the entire procedure.      Indira Wadsworth MD

## 2023-03-07 NOTE — LETTER
3/7/2023       RE: Wade Pozo  [address]  [facility] [address]     Dear Colleague,    Thank you for referring your patient, Wade Pozo, to the [facility] NEUROLOGY CLINIC [place] at [facility]. Please see a copy of my visit note below.        Physicians     Wade Pozo MRN# [id number]   Age: 32 year old YOB: 1990         Botulinum Toxin Procedure Note        The procedure was explained to the patient. Benefits of the treatment were discussed including headache and migraine reduction. Risks of the procedure were reviewed including but not limited to pain, bruising, bleeding, infection, weakness of muscles injected or those distal to injection. The patient voiced understanding of the risks and benefits. All questions answered and the patient consented to proceed.      Prior to receiving Botox injections the patient reported the following:  Baseline headache frequency: 30 days a month duration variable minutes to hours  Atypical facial pain felt to be a migraine variant after discussion in complex facial pain clinic.  Associated migrainous features: light sensitivity, worse with movement, triggered by stress     Previous treatment trials: gabapentin, amitriptyline, topiramate,acupuncture, chiropractor  Started taking naltrexone (2 weeks)     Last Botox procedure: 12/6/22  Current migraine frequency: still daily pain but this seems to allow her to reduce gabapentin usage and manage pain. Less disability related to reduced productivity.   Current migraine duration: minutes to hours     A 200 unit vial of Botox was diluted with 4ml of 0.9% sodium chloride     Botox lot # and expiration date: See MAR for details  0.9% sodium chloride lot # and expiration date: See MAR for details     The following muscles were injected using a 30 gauge needle:  Procerus 1 site 5 units   2 sites (bilat) 10 units  Frontalis 4 sites  (bilat) 20 units  Temporalis 8 sites (bilat) 40 units  Trapezius muscles 6 sites (bilat) 30 units  Cervical paraspinals (bilat) 4 sites 20 units  Occipitalis 6 sites (bilat) 30 units     A total of 155 units were injected, 45 units wasted.   The patient tolerated the injections well. I was present for and performed the entire procedure.      Indira Wadsworth MD           Again, thank you for allowing me to participate in the care of your patient.      Sincerely,    Indira Wadsworth MD

## 2023-04-23 ENCOUNTER — HEALTH MAINTENANCE LETTER (OUTPATIENT)
Age: 33
End: 2023-04-23

## 2023-05-09 DIAGNOSIS — G43.719 INTRACTABLE CHRONIC MIGRAINE WITHOUT AURA AND WITHOUT STATUS MIGRAINOSUS: ICD-10-CM

## 2023-05-09 DIAGNOSIS — R51.9 FACIAL PAIN: ICD-10-CM

## 2023-05-09 RX ORDER — GABAPENTIN 600 MG/1
1200 TABLET ORAL 4 TIMES DAILY
Qty: 240 TABLET | Refills: 2 | Status: SHIPPED | OUTPATIENT
Start: 2023-05-09 | End: 2023-09-05

## 2023-05-09 NOTE — TELEPHONE ENCOUNTER
Rx Authorization:    Requested Medication/ Dosegabapentin (NEURONTIN) 600 MG tablet    Date last refill ordered: 10/22/23    Quantity ordered: 240 tablet    # refills: 2    Date of last clinic visit with ordering provider: 3/7/23    Date of next clinic visit with ordering provider:     All pertinent protocol data (lab date/result):     Include pertinent information from patients message:

## 2023-06-06 ENCOUNTER — OFFICE VISIT (OUTPATIENT)
Dept: NEUROLOGY | Facility: CLINIC | Age: 33
End: 2023-06-06
Payer: COMMERCIAL

## 2023-06-06 DIAGNOSIS — G43.719 INTRACTABLE CHRONIC MIGRAINE WITHOUT AURA AND WITHOUT STATUS MIGRAINOSUS: Primary | ICD-10-CM

## 2023-06-06 PROCEDURE — 64615 CHEMODENERV MUSC MIGRAINE: CPT | Performed by: PSYCHIATRY & NEUROLOGY

## 2023-06-06 NOTE — LETTER
6/6/2023       RE: Wade Pozo  [address]  [facility] [address]     Dear Colleague,    Thank you for referring your patient, Wade Pozo, to the [facility] NEUROLOGY CLINIC [place] at [facility]. Please see a copy of my visit note below.        Physicians     Wade Pozo MRN# [id number]   Age: 32 year old YOB: 1990         Botulinum Toxin Procedure Note        The procedure was explained to the patient. Benefits of the treatment were discussed including headache and migraine reduction. Risks of the procedure were reviewed including but not limited to pain, bruising, bleeding, infection, weakness of muscles injected or those distal to injection. The patient voiced understanding of the risks and benefits. All questions answered and the patient consented to proceed.      Prior to receiving Botox injections the patient reported the following:  Baseline headache frequency: 30 days a month duration variable minutes to hours  Atypical facial pain felt to be a migraine variant after discussion in complex facial pain clinic.  Associated migrainous features: light sensitivity, worse with movement, triggered by stress     Previous treatment trials: gabapentin, amitriptyline, topiramate,acupuncture, chiropractor, naltrexone (actually made pain worse)     Last Botox procedure: 3/7/23  Current migraine frequency: She was using naltrexone and it made her pain worse. She has now stopped it. Gabapentin takes pain to 0/10 and that works for 2-3 hours. Botox also helps reduce over all pain by 50%. Visual aura still present.   Current migraine duration: minutes to hours     A 200 unit vial of Botox was diluted with 4ml of 0.9% sodium chloride     Botox lot # and expiration date: See MAR for details  0.9% sodium chloride lot # and expiration date: See MAR for details     The following muscles were injected using a 30 gauge needle:  Procerus  1 site 5 units   2 sites (bilat) 10 units  Frontalis 4 sites (bilat) 20 units  Temporalis 8 sites (bilat) 40 units  Trapezius muscles 6 sites (bilat) 30 units  Cervical paraspinals (bilat) 4 sites 20 units  Occipitalis 6 sites (bilat) 30 units     A total of 155 units were injected, 45 units wasted.   The patient tolerated the injections well. I was present for and performed the entire procedure.      Indira Wadsworth MD

## 2023-06-06 NOTE — PROGRESS NOTES
LOUISA Olvera ROBIN Pozo MRN# [id number]   Age: 32 year old YOB: 1990         Botulinum Toxin Procedure Note        The procedure was explained to the patient. Benefits of the treatment were discussed including headache and migraine reduction. Risks of the procedure were reviewed including but not limited to pain, bruising, bleeding, infection, weakness of muscles injected or those distal to injection. The patient voiced understanding of the risks and benefits. All questions answered and the patient consented to proceed.      Prior to receiving Botox injections the patient reported the following:  Baseline headache frequency: 30 days a month duration variable minutes to hours  Atypical facial pain felt to be a migraine variant after discussion in complex facial pain clinic.  Associated migrainous features: light sensitivity, worse with movement, triggered by stress     Previous treatment trials: gabapentin, amitriptyline, topiramate,acupuncture, chiropractor, naltrexone (actually made pain worse)     Last Botox procedure: 3/7/23  Current migraine frequency: She was using naltrexone and it made her pain worse. She has now stopped it. Gabapentin takes pain to 0/10 and that works for 2-3 hours. Botox also helps reduce over all pain by 50%. Visual aura still present.   Current migraine duration: minutes to hours     A 200 unit vial of Botox was diluted with 4ml of 0.9% sodium chloride     Botox lot # and expiration date: See MAR for details  0.9% sodium chloride lot # and expiration date: See MAR for details     The following muscles were injected using a 30 gauge needle:  Procerus 1 site 5 units   2 sites (bilat) 10 units  Frontalis 4 sites (bilat) 20 units  Temporalis 8 sites (bilat) 40 units  Trapezius muscles 6 sites (bilat) 30 units  Cervical paraspinals (bilat) 4 sites 20 units  Occipitalis 6 sites (bilat) 30 units     A total of 155 units were injected, 45 units wasted.   The  patient tolerated the injections well. I was present for and performed the entire procedure.      Indira Wadsworth MD

## 2023-07-11 DIAGNOSIS — G43.719 INTRACTABLE CHRONIC MIGRAINE WITHOUT AURA AND WITHOUT STATUS MIGRAINOSUS: Primary | ICD-10-CM

## 2023-08-29 ENCOUNTER — OFFICE VISIT (OUTPATIENT)
Dept: NEUROLOGY | Facility: CLINIC | Age: 33
End: 2023-08-29
Payer: COMMERCIAL

## 2023-08-29 DIAGNOSIS — G43.719 INTRACTABLE CHRONIC MIGRAINE WITHOUT AURA AND WITHOUT STATUS MIGRAINOSUS: Primary | ICD-10-CM

## 2023-08-29 PROCEDURE — 64615 CHEMODENERV MUSC MIGRAINE: CPT | Performed by: PSYCHIATRY & NEUROLOGY

## 2023-08-29 NOTE — PROGRESS NOTES
LOUISA KELLY Nohelia MRN# [id number]   Age: 32 year old YOB: 1990         Botulinum Toxin Procedure Note        The procedure was explained to the patient. Benefits of the treatment were discussed including headache and migraine reduction. Risks of the procedure were reviewed including but not limited to pain, bruising, bleeding, infection, weakness of muscles injected or those distal to injection. The patient voiced understanding of the risks and benefits. All questions answered and the patient consented to proceed.      Prior to receiving Botox injections the patient reported the following:  Baseline headache frequency: 30 days a month duration variable minutes to hours  Atypical facial pain felt to be a migraine variant after discussion in complex facial pain clinic.  Associated migrainous features: light sensitivity, worse with movement, triggered by stress     Previous treatment trials: gabapentin, amitriptyline, topiramate,acupuncture, chiropractor, naltrexone (actually made pain worse)     Last Botox procedure: 6/6/23  Current migraine frequency: She has now stopped it. Gabapentin takes pain to 0/10 and that works for 2-3 hours. Botox also helps reduce over all pain by 50% in the eyebrows and face.   Current migraine duration: minutes to hours     A 200 unit vial of Botox was diluted with 4ml of 0.9% sodium chloride     Botox lot # and expiration date: See MAR for details  0.9% sodium chloride lot # and expiration date: See MAR for details     The following muscles were injected using a 30 gauge needle:  Procerus 1 site 5 units   2 sites (bilat) 10 units  Frontalis 4 sites (bilat) 20 units  Temporalis 8 sites (bilat) 40 units  Trapezius muscles 6 sites (bilat) 30 units  Cervical paraspinals (bilat) 4 sites 20 units  Occipitalis 6 sites (bilat) 30 units     A total of 155 units were injected, 45 units wasted.   The patient tolerated the injections well. I was present  for the entire procedure. I directly supervised Dr Grande PGY2 injecting the trapezius, paraspinals and occipitalis muscles, I performed the remainder of the injections.      Indira Wadsworth MD

## 2023-08-29 NOTE — LETTER
8/29/2023       RE: Wade Pozo  [address]  [facility] [address]     Dear Colleague,    Thank you for referring your patient, Wade Pozo, to the [facility] NEUROLOGY CLINIC [place] at [facility]. Please see a copy of my visit note below.        Physicians     Wade Pozo MRN# [id number]   Age: 32 year old YOB: 1990         Botulinum Toxin Procedure Note        The procedure was explained to the patient. Benefits of the treatment were discussed including headache and migraine reduction. Risks of the procedure were reviewed including but not limited to pain, bruising, bleeding, infection, weakness of muscles injected or those distal to injection. The patient voiced understanding of the risks and benefits. All questions answered and the patient consented to proceed.      Prior to receiving Botox injections the patient reported the following:  Baseline headache frequency: 30 days a month duration variable minutes to hours  Atypical facial pain felt to be a migraine variant after discussion in complex facial pain clinic.  Associated migrainous features: light sensitivity, worse with movement, triggered by stress     Previous treatment trials: gabapentin, amitriptyline, topiramate,acupuncture, chiropractor, naltrexone (actually made pain worse)     Last Botox procedure: 6/6/23  Current migraine frequency: She has now stopped it. Gabapentin takes pain to 0/10 and that works for 2-3 hours. Botox also helps reduce over all pain by 50% in the eyebrows and face.   Current migraine duration: minutes to hours     A 200 unit vial of Botox was diluted with 4ml of 0.9% sodium chloride     Botox lot # and expiration date: See MAR for details  0.9% sodium chloride lot # and expiration date: See MAR for details     The following muscles were injected using a 30 gauge needle:  Procerus 1 site 5 units   2 sites (bilat) 10  units  Frontalis 4 sites (bilat) 20 units  Temporalis 8 sites (bilat) 40 units  Trapezius muscles 6 sites (bilat) 30 units  Cervical paraspinals (bilat) 4 sites 20 units  Occipitalis 6 sites (bilat) 30 units     A total of 155 units were injected, 45 units wasted.   The patient tolerated the injections well. I was present for the entire procedure. I directly supervised Dr Grande PGY2 injecting the trapezius, paraspinals and occipitalis muscles, I performed the remainder of the injections.         Again, thank you for allowing me to participate in the care of your patient.      Sincerely,    Indira Wadsworth MD

## 2023-09-05 DIAGNOSIS — G43.719 INTRACTABLE CHRONIC MIGRAINE WITHOUT AURA AND WITHOUT STATUS MIGRAINOSUS: ICD-10-CM

## 2023-09-05 DIAGNOSIS — R51.9 FACIAL PAIN: ICD-10-CM

## 2023-09-05 RX ORDER — GABAPENTIN 600 MG/1
1200 TABLET ORAL 4 TIMES DAILY
Qty: 240 TABLET | Refills: 2 | Status: SHIPPED | OUTPATIENT
Start: 2023-09-05 | End: 2023-09-07

## 2023-09-05 NOTE — TELEPHONE ENCOUNTER
Rx Authorization:  Requested Medication/ Dose gabapentin (NEURONTIN) 600 MG tablet 240  Date last refill ordered: 5/9/23  Quantity ordered: 240  # refills: 2  Date of last clinic visit with ordering provider: 8/29/23  Date of next clinic visit with ordering provider:   All pertinent protocol data (lab date/result):   Include pertinent information from patients message:

## 2023-09-06 ENCOUNTER — TELEPHONE (OUTPATIENT)
Dept: NEUROLOGY | Facility: CLINIC | Age: 33
End: 2023-09-06
Payer: COMMERCIAL

## 2023-09-06 NOTE — TELEPHONE ENCOUNTER
Prior Authorization Retail Medication Request    Medication/Dose: gabapentin (NEURONTIN) 600 MG tablet   ICD code (if differgabapentin :    Previously Tried and Failed:  See Chart  Rationale:  See Chart    Insurance Name:  [facility]  Insurance ID:  [id number]      Pharmacy Information (if different than what is on RX)  Name:    Phone:

## 2023-09-07 ENCOUNTER — MYC MEDICAL ADVICE (OUTPATIENT)
Dept: NEUROLOGY | Facility: CLINIC | Age: 33
End: 2023-09-07
Payer: COMMERCIAL

## 2023-09-07 DIAGNOSIS — R51.9 FACIAL PAIN: ICD-10-CM

## 2023-09-07 DIAGNOSIS — G43.719 INTRACTABLE CHRONIC MIGRAINE WITHOUT AURA AND WITHOUT STATUS MIGRAINOSUS: ICD-10-CM

## 2023-09-07 RX ORDER — GABAPENTIN 600 MG/1
1200 TABLET ORAL 3 TIMES DAILY
Qty: 180 TABLET | Refills: 11 | Status: SHIPPED | OUTPATIENT
Start: 2023-09-07 | End: 2023-11-28

## 2023-09-11 NOTE — TELEPHONE ENCOUNTER
Prior Authorization Not Needed per Insurance    Medication:   Insurance Company: [employer] - Phone [phone] Fax [phone]  Expected CoPay:      Pharmacy Filling the Rx: [id number] [place] [id number]  Pharmacy Notified: Yes  Patient Notified: Yes    Per call to pharmacy they have a paid claim for the Rx

## 2023-10-08 ASSESSMENT — HEADACHE IMPACT TEST (HIT 6)
HOW OFTEN HAVE YOU FELT TOO TIRED TO WORK BECAUSE OF YOUR HEADACHES: SOMETIMES
HIT6 TOTAL SCORE: 70
HOW OFTEN HAVE YOU FELT FED UP OR IRRITATED BECAUSE OF YOUR HEADACHES: SOMETIMES
WHEN YOU HAVE A HEADACHE HOW OFTEN DO YOU WISH YOU COULD LIE DOWN: ALWAYS
HOW OFTEN DO HEADACHES LIMIT YOUR DAILY ACTIVITIES: ALWAYS
WHEN YOU HAVE HEADACHES HOW OFTEN IS THE PAIN SEVERE: VERY OFTEN
HOW OFTEN DID HEADACHS LIMIT CONCENTRATION ON WORK OR DAILY ACTIVITY: ALWAYS

## 2023-10-09 ENCOUNTER — VIRTUAL VISIT (OUTPATIENT)
Dept: NEUROLOGY | Facility: CLINIC | Age: 33
End: 2023-10-09
Payer: COMMERCIAL

## 2023-10-09 VITALS — BODY MASS INDEX: 26.68 KG/M2 | WEIGHT: 166 LBS | HEIGHT: 66 IN

## 2023-10-09 DIAGNOSIS — G43.719 INTRACTABLE CHRONIC MIGRAINE WITHOUT AURA AND WITHOUT STATUS MIGRAINOSUS: Primary | ICD-10-CM

## 2023-10-09 DIAGNOSIS — R51.9 FACIAL PAIN: ICD-10-CM

## 2023-10-09 PROCEDURE — 99213 OFFICE O/P EST LOW 20 MIN: CPT | Mod: VID | Performed by: PSYCHIATRY & NEUROLOGY

## 2023-10-09 ASSESSMENT — PAIN SCALES - GENERAL: PAINLEVEL: NO PAIN (0)

## 2023-10-09 NOTE — NURSING NOTE
Is the patient currently in the state of [place]? YES    Visit mode:VIDEO    If the visit is dropped, the patient can be reconnected by: VIDEO VISIT: Text to cell phone:   Telephone Information:   Mobile [phone]       Will anyone else be joining the visit? NO  (If patient encounters technical issues they should call [phone]    How would you like to obtain your AVS? MyChart    Are changes needed to the allergy or medication list? No    Reason for visit: Follow Up    Cheri HOOK

## 2023-10-09 NOTE — LETTER
10/9/2023       RE: Wade Pozo  [address]  [facility] [address]     Dear Colleague,    Thank you for referring your patient, Wade Pozo, to the [facility] NEUROLOGY [facility]INIC [place] at [facility]. Please see a copy of my visit note below.          [facility]    Wade Pozo MRN# [id number]   Age: 33 year old YOB: 1990            Assessment and Plan:     (G43.719) Intractable chronic migraine without aura and without status migrainosus  (primary encounter diagnosis)  (R51.9) Facial pain  Comment: Arun has her pain under control with the combination of gabapentin and Botox and we will continue    Functional improvement is fewer missed days of work and social activities.     20 minute spent caring for the patient on the day of the visit     Indira Wadsworth MD             History of Present Illness:   CC: Recheck    Wade is a 33 year old woman with intractable facial pain in her forehead felt to be a chronic migrain variant.     Prior to starting Botox she was not functional when off gabapentin.   Now she still needs gabapentin but off gabapentin she can still function with pain around 7/10.  She can take 6 pills a day of gabapentin.    Botox is getting covered well. Savings program is reimbursing what isn't being reimbursed by insurance.          Physical Exam:     Paralyzed frontalis, procerus and .          Pertinent History/Data for appointment:     Meds reviewed        Again, thank you for allowing me to participate in the care of your patient.      Sincerely,    Indira Wadsworth MD

## 2023-10-09 NOTE — PROGRESS NOTES
Virtual Visit Details    Type of service:  Video Visit     Originating Location (pt. Location): Home    Distant Location (provider location):  Off-site  Platform used for Video Visit: [facility]    Wade Pozo MRN# [id number]   Age: 33 year old YOB: 1990            Assessment and Plan:     (G43.719) Intractable chronic migraine without aura and without status migrainosus  (primary encounter diagnosis)  (R51.9) Facial pain  Comment: Arun has her pain under control with the combination of gabapentin and Botox and we will continue    Functional improvement is fewer missed days of work and social activities.     20 minute spent caring for the patient on the day of the visit     Indira Wadsworth MD             History of Present Illness:   CC: Recheck    Wade is a 33 year old woman with intractable facial pain in her forehead felt to be a chronic migrain variant.     Prior to starting Botox she was not functional when off gabapentin.   Now she still needs gabapentin but off gabapentin she can still function with pain around 7/10.  She can take 6 pills a day of gabapentin.    Botox is getting covered well. Savings program is reimbursing what isn't being reimbursed by insurance.          Physical Exam:     Paralyzed frontalis, procerus and .          Pertinent History/Data for appointment:     Meds reviewed

## 2023-11-28 ENCOUNTER — OFFICE VISIT (OUTPATIENT)
Dept: NEUROLOGY | Facility: CLINIC | Age: 33
End: 2023-11-28
Payer: COMMERCIAL

## 2023-11-28 DIAGNOSIS — G43.719 INTRACTABLE CHRONIC MIGRAINE WITHOUT AURA AND WITHOUT STATUS MIGRAINOSUS: Primary | ICD-10-CM

## 2023-11-28 DIAGNOSIS — R51.9 FACIAL PAIN: ICD-10-CM

## 2023-11-28 PROCEDURE — 64615 CHEMODENERV MUSC MIGRAINE: CPT | Performed by: PSYCHIATRY & NEUROLOGY

## 2023-11-28 RX ORDER — GABAPENTIN 600 MG/1
1200 TABLET ORAL 3 TIMES DAILY
Qty: 540 TABLET | Refills: 3 | Status: SHIPPED | OUTPATIENT
Start: 2023-11-28 | End: 2024-05-28

## 2023-11-28 NOTE — PROGRESS NOTES
Botulinum Toxin Procedure Note    Wade Pozo  [id number]    Ordering provider: Indira Wadsworth MD    The procedure was explained to the patient. Benefits of the treatment were discussed including headache and migraine reduction. Risks of the procedure were reviewed including but not limited to pain, bruising, bleeding, infection, weakness of muscles injected or those distal to injection. The patient voiced understanding of the risks and benefits. All questions answered and the patient consented to proceed.     Prior to receiving Botox injections the patient reported the following:  Baseline headache/migraine frequency: 30 days a month  Baseline headache/migraine duration: minutes to hours in length, more than 4 hours of pain in a day. Atypical facial pain felt to be migraine variant after being seen in facial pain clinic.  Associated migrainous features: light sensitivity, worse with movement    Previous treatment trials:gabapentin, amitriptyline, topiramate,acupuncture, chiropractor, naltrexone (actually made pain worse)     Last Botox procedure: 8/29/2023  Current migraine frequency: hard to define as her pain manifests as facial pain. With use of gabapentin and Botox together overall severity is reduced by 50% and she has less disability. She does not have pain free days.     Functional improvements since starting botulinum toxin treatments: fewer missed days of work. Able to go to more social events. Able to care for her child.    Last Neurology office visit: 10/9/2023    A 200 unit vial of Botox was diluted with 4ml of 0.9% sodium chloride    Botox lot # and expiration date: See MAR for details  0.9% sodium chloride lot # and expiration date: See MAR for details    The following muscles were injected using a 30 gauge needle:  Procerus 1 site 5 units   2 sites (bilat) 10 units  Frontalis 4 sites (bilat) 20 units  Temporalis 8 sites (bilat) 40 units  Trapezius muscles 6 sites (bilat) 30  units  Cervical paraspinals (bilat) 4 sites 20 units  Occipitalis 6 sites (bilat) 30 units    A total of 155 units were injected, 45 wasted.   The patient tolerated the injections well. I was present for and performed the entire procedure.     Indira Wadsworth MD

## 2023-11-28 NOTE — LETTER
11/28/2023       RE: Wade Pozo  [address]  [facility] [address]       Dear Colleague,    Thank you for referring your patient, Wade Pozo, to the [facility] NEUROLOGY CLINIC [place] at [facility]. Please see a copy of my visit note below.        Botulinum Toxin Procedure Note    Wade Pozo  [id number]    Ordering provider: Indira Wadsworth MD    The procedure was explained to the patient. Benefits of the treatment were discussed including headache and migraine reduction. Risks of the procedure were reviewed including but not limited to pain, bruising, bleeding, infection, weakness of muscles injected or those distal to injection. The patient voiced understanding of the risks and benefits. All questions answered and the patient consented to proceed.     Prior to receiving Botox injections the patient reported the following:  Baseline headache/migraine frequency: 30 days a month  Baseline headache/migraine duration: minutes to hours in length, more than 4 hours of pain in a day. Atypical facial pain felt to be migraine variant after being seen in facial pain clinic.  Associated migrainous features: light sensitivity, worse with movement    Previous treatment trials:gabapentin, amitriptyline, topiramate,acupuncture, chiropractor, naltrexone (actually made pain worse)     Last Botox procedure: 8/29/2023  Current migraine frequency: hard to define as her pain manifests as facial pain. With use of gabapentin and Botox together overall severity is reduced by 50% and she has less disability. She does not have pain free days.     Functional improvements since starting botulinum toxin treatments: fewer missed days of work. Able to go to more social events. Able to care for her child.    Last Neurology office visit: 10/9/2023    A 200 unit vial of Botox was diluted with 4ml of 0.9% sodium chloride    Botox lot # and expiration date: See MAR for  details  0.9% sodium chloride lot # and expiration date: See MAR for details    The following muscles were injected using a 30 gauge needle:  Procerus 1 site 5 units   2 sites (bilat) 10 units  Frontalis 4 sites (bilat) 20 units  Temporalis 8 sites (bilat) 40 units  Trapezius muscles 6 sites (bilat) 30 units  Cervical paraspinals (bilat) 4 sites 20 units  Occipitalis 6 sites (bilat) 30 units    A total of 155 units were injected, 45 wasted.   The patient tolerated the injections well. I was present for and performed the entire procedure.         Again, thank you for allowing me to participate in the care of your patient.      Sincerely,    Indira Wadsworth MD

## 2024-02-07 ENCOUNTER — TELEPHONE (OUTPATIENT)
Dept: NEUROLOGY | Facility: CLINIC | Age: 34
End: 2024-02-07
Payer: COMMERCIAL

## 2024-02-07 NOTE — TELEPHONE ENCOUNTER
LVM, sent MyC for pt to resched appt from 2/20 with Dr. Wadsworth to 2/27. Per Dr. Wadsworth    Please manually check providers sched, please manually enter appt.    2/7/24 BD

## 2024-02-27 ENCOUNTER — OFFICE VISIT (OUTPATIENT)
Dept: NEUROLOGY | Facility: CLINIC | Age: 34
End: 2024-02-27
Payer: COMMERCIAL

## 2024-02-27 VITALS
SYSTOLIC BLOOD PRESSURE: 112 MMHG | RESPIRATION RATE: 16 BRPM | OXYGEN SATURATION: 99 % | DIASTOLIC BLOOD PRESSURE: 79 MMHG | HEART RATE: 81 BPM

## 2024-02-27 DIAGNOSIS — G43.719 INTRACTABLE CHRONIC MIGRAINE WITHOUT AURA AND WITHOUT STATUS MIGRAINOSUS: Primary | ICD-10-CM

## 2024-02-27 PROCEDURE — 64615 CHEMODENERV MUSC MIGRAINE: CPT | Performed by: PSYCHIATRY & NEUROLOGY

## 2024-02-27 NOTE — NURSING NOTE
Chief Complaint   Patient presents with    Procedure     Here for injections, confirmed with patient     Marilou Laura

## 2024-02-27 NOTE — PROGRESS NOTES
Botulinum Toxin Procedure Note     Wade Pozo  [id number]     Ordering provider: Indira Wadsworth MD     The procedure was explained to the patient. Benefits of the treatment were discussed including headache and migraine reduction. Risks of the procedure were reviewed including but not limited to pain, bruising, bleeding, infection, weakness of muscles injected or those distal to injection. The patient voiced understanding of the risks and benefits. All questions answered and the patient consented to proceed.      Prior to receiving Botox injections the patient reported the following:  Baseline headache/migraine frequency: 30 days a month  Baseline headache/migraine duration: minutes to hours in length, more than 4 hours of pain in a day. Atypical facial pain felt to be migraine variant after being seen in facial pain clinic.  Associated migrainous features: light sensitivity, worse with movement     Previous treatment trials:gabapentin, amitriptyline, topiramate,acupuncture, chiropractor, naltrexone (actually made pain worse)      Last Botox procedure: 8/29/2023  Current migraine frequency: hard to define as her pain manifests as facial pain. With use of gabapentin and Botox together overall severity is reduced by 50% and she has less disability. She does not have pain free days.      Functional improvements since starting botulinum toxin treatments: fewer missed days of work. Able to go to more social events. Able to care for her child.     Last Neurology office visit: 11/28/2023     A 200 unit vial of Botox was diluted with 4ml of 0.9% sodium chloride     Botox lot # and expiration date: See MAR for details  0.9% sodium chloride lot # and expiration date: See MAR for details     The following muscles were injected using a 30 gauge needle:  Procerus 1 site 5 units   2 sites (bilat) 10 units  Frontalis 4 sites (bilat) 20 units  **Temporalis 8 sites (bilat) 50 units  **Masseter 2 sites (bilat) 20  units  Cervical paraspinals (bilat) 4 sites 20 units  Occipitalis 6 sites (bilat) 30 units  Avoiding trapezius to have more facial dosing    A total of 155 units were injected, 45 units wasted.   The patient tolerated the injections well. I was present for and performed the entire procedure.      Indira Wadsworth MD

## 2024-02-27 NOTE — LETTER
2/27/2024       RE: Wade Pozo  [address]  [facility] [address]     Dear Colleague,    Thank you for referring your patient, Wade Pozo, to the [facility] NEUROLOGY CLINIC [place] at [facility]. Please see a copy of my visit note below.    Botulinum Toxin Procedure Note     Wade Pozo  [id number]     Ordering provider: Indira Wadsworth MD     The procedure was explained to the patient. Benefits of the treatment were discussed including headache and migraine reduction. Risks of the procedure were reviewed including but not limited to pain, bruising, bleeding, infection, weakness of muscles injected or those distal to injection. The patient voiced understanding of the risks and benefits. All questions answered and the patient consented to proceed.      Prior to receiving Botox injections the patient reported the following:  Baseline headache/migraine frequency: 30 days a month  Baseline headache/migraine duration: minutes to hours in length, more than 4 hours of pain in a day. Atypical facial pain felt to be migraine variant after being seen in facial pain clinic.  Associated migrainous features: light sensitivity, worse with movement     Previous treatment trials:gabapentin, amitriptyline, topiramate,acupuncture, chiropractor, naltrexone (actually made pain worse)      Last Botox procedure: 8/29/2023  Current migraine frequency: hard to define as her pain manifests as facial pain. With use of gabapentin and Botox together overall severity is reduced by 50% and she has less disability. She does not have pain free days.      Functional improvements since starting botulinum toxin treatments: fewer missed days of work. Able to go to more social events. Able to care for her child.     Last Neurology office visit: 11/28/2023     A 200 unit vial of Botox was diluted with 4ml of 0.9% sodium chloride     Botox lot # and expiration date: See MAR  for details  0.9% sodium chloride lot # and expiration date: See MAR for details     The following muscles were injected using a 30 gauge needle:  Procerus 1 site 5 units   2 sites (bilat) 10 units  Frontalis 4 sites (bilat) 20 units  **Temporalis 8 sites (bilat) 50 units  **Masseter 2 sites (bilat) 20 units  Cervical paraspinals (bilat) 4 sites 20 units  Occipitalis 6 sites (bilat) 30 units  Avoiding trapezius to have more facial dosing    A total of 155 units were injected, 45 units wasted.   The patient tolerated the injections well. I was present for and performed the entire procedure.            Again, thank you for allowing me to participate in the care of your patient.      Sincerely,    Indira Wadsworth MD

## 2024-05-28 ENCOUNTER — OFFICE VISIT (OUTPATIENT)
Dept: NEUROLOGY | Facility: CLINIC | Age: 34
End: 2024-05-28
Payer: COMMERCIAL

## 2024-05-28 DIAGNOSIS — G43.719 INTRACTABLE CHRONIC MIGRAINE WITHOUT AURA AND WITHOUT STATUS MIGRAINOSUS: ICD-10-CM

## 2024-05-28 DIAGNOSIS — R51.9 FACIAL PAIN: ICD-10-CM

## 2024-05-28 PROCEDURE — 64615 CHEMODENERV MUSC MIGRAINE: CPT | Performed by: PSYCHIATRY & NEUROLOGY

## 2024-05-28 RX ORDER — GABAPENTIN 600 MG/1
1200 TABLET ORAL 3 TIMES DAILY
Qty: 540 TABLET | Refills: 3 | Status: SHIPPED | OUTPATIENT
Start: 2024-05-28

## 2024-05-28 NOTE — LETTER
5/28/2024       RE: Wade Pozo  [address]  [facility] [address]       Dear Colleague,    Thank you for referring your patient, Wade Pozo, to the [facility] NEUROLOGY CLINIC [place] at [facility]. Please see a copy of my visit note below.    Botulinum Toxin Procedure Note     Wade Pozo  [id number]     Ordering provider: Indira Wadsworth MD     The procedure was explained to the patient. Benefits of the treatment were discussed including headache and migraine reduction. Risks of the procedure were reviewed including but not limited to pain, bruising, bleeding, infection, weakness of muscles injected or those distal to injection. The patient voiced understanding of the risks and benefits. All questions answered and the patient consented to proceed.      Prior to receiving Botox injections the patient reported the following:  Baseline headache/migraine frequency: 30 days a month  Baseline headache/migraine duration: minutes to hours in length, more than 4 hours of pain in a day. Atypical facial pain felt to be migraine variant after being seen in facial pain clinic.  Associated migrainous features: light sensitivity, worse with movement     Previous treatment trials:gabapentin, amitriptyline, topiramate,acupuncture, chiropractor, naltrexone (actually made pain worse)      Last Botox procedure: 2/27/2024  Current migraine frequency: hard to define as her pain manifests as facial pain. With use of gabapentin and Botox together overall severity is reduced by 50% and she has less disability. She does not have pain free days.      Functional improvements since starting botulinum toxin treatments: fewer missed days of work. Able to go to more social events. Able to care for her child. Able to take less gabapentin     Last Neurology office visit: 11/28/2023     A 200 unit vial of Botox was diluted with 4ml of 0.9% sodium chloride     Botox lot  # and expiration date: See MAR for details  0.9% sodium chloride lot # and expiration date: See MAR for details     The following muscles were injected using a 30 gauge needle:  Procerus 1 site 5 units   2 sites (bilat) 10 units  Frontalis 4 sites (bilat) 20 units  **Temporalis 8 sites (bilat) 50 units  **Masseter 2 sites (bilat) 20 units  Cervical paraspinals (bilat) 4 sites 20 units  Occipitalis 6 sites (bilat) 30 units  Avoiding trapezius to have more facial dosing     A total of 155 units were injected, 45 units wasted.   The patient tolerated the injections well. I was present for and performed the entire procedure.          Again, thank you for allowing me to participate in the care of your patient.      Sincerely,    Indira Wadsworth MD

## 2024-05-28 NOTE — PROGRESS NOTES
Botulinum Toxin Procedure Note     Wade Pozo  [id number]     Ordering provider: Indira Wadsworth MD     The procedure was explained to the patient. Benefits of the treatment were discussed including headache and migraine reduction. Risks of the procedure were reviewed including but not limited to pain, bruising, bleeding, infection, weakness of muscles injected or those distal to injection. The patient voiced understanding of the risks and benefits. All questions answered and the patient consented to proceed.      Prior to receiving Botox injections the patient reported the following:  Baseline headache/migraine frequency: 30 days a month  Baseline headache/migraine duration: minutes to hours in length, more than 4 hours of pain in a day. Atypical facial pain felt to be migraine variant after being seen in facial pain clinic.  Associated migrainous features: light sensitivity, worse with movement     Previous treatment trials:gabapentin, amitriptyline, topiramate,acupuncture, chiropractor, naltrexone (actually made pain worse)      Last Botox procedure: 2/27/2024  Current migraine frequency: hard to define as her pain manifests as facial pain. With use of gabapentin and Botox together overall severity is reduced by 50% and she has less disability. She does not have pain free days.      Functional improvements since starting botulinum toxin treatments: fewer missed days of work. Able to go to more social events. Able to care for her child. Able to take less gabapentin     Last Neurology office visit: 11/28/2023     A 200 unit vial of Botox was diluted with 4ml of 0.9% sodium chloride     Botox lot # and expiration date: See MAR for details  0.9% sodium chloride lot # and expiration date: See MAR for details     The following muscles were injected using a 30 gauge needle:  Procerus 1 site 5 units   2 sites (bilat) 10 units  Frontalis 4 sites (bilat) 20 units  **Temporalis 8 sites (bilat) 50  units  **Masseter 2 sites (bilat) 20 units  Cervical paraspinals (bilat) 4 sites 20 units  Occipitalis 6 sites (bilat) 30 units  Avoiding trapezius to have more facial dosing     A total of 155 units were injected, 45 units wasted.   The patient tolerated the injections well. I was present for and performed the entire procedure.      Indira Wadsworth MD

## 2024-08-01 DIAGNOSIS — G43.719 INTRACTABLE CHRONIC MIGRAINE WITHOUT AURA AND WITHOUT STATUS MIGRAINOSUS: Primary | ICD-10-CM

## 2024-08-18 ENCOUNTER — MYC MEDICAL ADVICE (OUTPATIENT)
Dept: NEUROLOGY | Facility: CLINIC | Age: 34
End: 2024-08-18
Payer: COMMERCIAL

## 2024-08-18 DIAGNOSIS — G43.719 INTRACTABLE CHRONIC MIGRAINE WITHOUT AURA AND WITHOUT STATUS MIGRAINOSUS: Primary | ICD-10-CM

## 2024-08-18 DIAGNOSIS — R51.9 FACIAL PAIN: ICD-10-CM

## 2024-08-19 RX ORDER — GABAPENTIN 600 MG/1
1200 TABLET ORAL 3 TIMES DAILY
Qty: 42 TABLET | Refills: 0 | Status: SHIPPED | OUTPATIENT
Start: 2024-08-19 | End: 2024-08-26

## 2024-08-20 ENCOUNTER — OFFICE VISIT (OUTPATIENT)
Dept: NEUROLOGY | Facility: CLINIC | Age: 34
End: 2024-08-20
Payer: COMMERCIAL

## 2024-08-20 DIAGNOSIS — G43.719 INTRACTABLE CHRONIC MIGRAINE WITHOUT AURA AND WITHOUT STATUS MIGRAINOSUS: Primary | ICD-10-CM

## 2024-08-20 PROCEDURE — 64615 CHEMODENERV MUSC MIGRAINE: CPT | Performed by: PSYCHIATRY & NEUROLOGY

## 2024-08-20 NOTE — LETTER
8/20/2024       RE: Wade Pozo  [address]  [place] [address]     Dear Colleague,    Thank you for referring your patient, Wade Pozo, to the [facility] NEUROLOGY CLINIC [place] at [facility]. Please see a copy of my visit note below.    Botulinum Toxin Procedure Note     Wade Pozo  [id number]     Ordering provider: Indira Wadsworth MD     The procedure was explained to the patient. Benefits of the treatment were discussed including headache and migraine reduction. Risks of the procedure were reviewed including but not limited to pain, bruising, bleeding, infection, weakness of muscles injected or those distal to injection. The patient voiced understanding of the risks and benefits. All questions answered and the patient consented to proceed.      Prior to receiving Botox injections the patient reported the following:  Baseline headache/migraine frequency: 30 days a month  Baseline headache/migraine duration: minutes to hours in length, more than 4 hours of pain in a day. Atypical facial pain felt to be migraine variant after being seen in facial pain clinic.  Associated migrainous features: light sensitivity, worse with movement     Previous treatment trials:gabapentin, amitriptyline, topiramate,acupuncture, chiropractor, naltrexone (actually made pain worse)      Last Botox procedure: 5/28/24  Current migraine frequency: Daily facial pain, made worse by chronic stress. Botox helps along with gabapentin cut pain down by 50% but still feels worse. Will go back to past dosing strategy today.      Functional improvements since starting botulinum toxin treatments: fewer missed days of work. Able to go to more social events. Able to care for her child. Able to take less gabapentin     Last Neurology office visit: 11/28/2023     A 200 unit vial of Botox was diluted with 4ml of 0.9% sodium chloride     Botox lot # and expiration date:  See MAR for details  0.9% sodium chloride lot # and expiration date: See MAR for details     The following muscles were injected using a 30 gauge needle:  Procerus 1 site 5 units   2 sites (bilat) 10 units  Frontalis 4 sites (bilat) 20 units  Temporalis 8 sites (bilat) 40 units  Trapezius muscles 6 sites (bilat) 30 units  Cervical paraspinals (bilat) 4 sites 20 units  Occipitalis 6 sites (bilat) 30 units     A total of 155 units were injected, 45 units wasted.   The patient tolerated the injections well. I was present for and performed the entire procedure.      Indira Wadsworth MD        Again, thank you for allowing me to participate in the care of your patient.      Sincerely,    Indira Wadsworth MD

## 2024-08-20 NOTE — PROGRESS NOTES
Botulinum Toxin Procedure Note     Wade Pozo  [id number]     Ordering provider: Indira Wadsworth MD     The procedure was explained to the patient. Benefits of the treatment were discussed including headache and migraine reduction. Risks of the procedure were reviewed including but not limited to pain, bruising, bleeding, infection, weakness of muscles injected or those distal to injection. The patient voiced understanding of the risks and benefits. All questions answered and the patient consented to proceed.      Prior to receiving Botox injections the patient reported the following:  Baseline headache/migraine frequency: 30 days a month  Baseline headache/migraine duration: minutes to hours in length, more than 4 hours of pain in a day. Atypical facial pain felt to be migraine variant after being seen in facial pain clinic.  Associated migrainous features: light sensitivity, worse with movement     Previous treatment trials:gabapentin, amitriptyline, topiramate,acupuncture, chiropractor, naltrexone (actually made pain worse)      Last Botox procedure: 5/28/24  Current migraine frequency: Daily facial pain, made worse by chronic stress. Botox helps along with gabapentin cut pain down by 50% but still feels worse. Will go back to past dosing strategy today.      Functional improvements since starting botulinum toxin treatments: fewer missed days of work. Able to go to more social events. Able to care for her child. Able to take less gabapentin     Last Neurology office visit: 11/28/2023     A 200 unit vial of Botox was diluted with 4ml of 0.9% sodium chloride     Botox lot # and expiration date: See MAR for details  0.9% sodium chloride lot # and expiration date: See MAR for details     The following muscles were injected using a 30 gauge needle:  Procerus 1 site 5 units   2 sites (bilat) 10 units  Frontalis 4 sites (bilat) 20 units  Temporalis 8 sites (bilat) 40 units  Trapezius muscles 6 sites  (bilat) 30 units  Cervical paraspinals (bilat) 4 sites 20 units  Occipitalis 6 sites (bilat) 30 units     A total of 155 units were injected, 45 units wasted.   The patient tolerated the injections well. I was present for and performed the entire procedure.      Indira Wadsworth MD

## 2024-10-31 ENCOUNTER — TELEPHONE (OUTPATIENT)
Dept: NEUROLOGY | Facility: CLINIC | Age: 34
End: 2024-10-31
Payer: COMMERCIAL

## 2024-10-31 NOTE — TELEPHONE ENCOUNTER
Left Voicemail (1st Attempt) and Sent Mychart (1st Attempt) for the patient to call back and schedule the following:    Appointment type: Return Headache  Provider: Dr. Wadsworth  Return date: Feb or next avail  Specialty phone number: [phone]  Additional appointment(s) needed:   Additonal Notes:     Can be virtual

## 2024-11-04 ENCOUNTER — TELEPHONE (OUTPATIENT)
Dept: NEUROLOGY | Facility: CLINIC | Age: 34
End: 2024-11-04
Payer: COMMERCIAL

## 2024-11-04 NOTE — TELEPHONE ENCOUNTER
Sent Mychart (1st Attempt) and Left Voicemail (2nd Attempt) for the patient to call back and schedule the following:    Appointment type: Return Headache  Provider: Dr. Wadsworth  Return date: Feb./next avail  Specialty phone number: [phone]  Additional appointment(s) needed:   Additonal Notes:

## 2024-11-19 ENCOUNTER — OFFICE VISIT (OUTPATIENT)
Dept: NEUROLOGY | Facility: CLINIC | Age: 34
End: 2024-11-19
Payer: COMMERCIAL

## 2024-11-19 DIAGNOSIS — G43.719 INTRACTABLE CHRONIC MIGRAINE WITHOUT AURA AND WITHOUT STATUS MIGRAINOSUS: Primary | ICD-10-CM

## 2024-11-19 ASSESSMENT — HEADACHE IMPACT TEST (HIT 6)
HOW OFTEN HAVE YOU FELT TOO TIRED TO WORK BECAUSE OF YOUR HEADACHES: VERY OFTEN
HOW OFTEN DO HEADACHES LIMIT YOUR DAILY ACTIVITIES: ALWAYS
WHEN YOU HAVE A HEADACHE HOW OFTEN DO YOU WISH YOU COULD LIE DOWN: ALWAYS
WHEN YOU HAVE HEADACHES HOW OFTEN IS THE PAIN SEVERE: VERY OFTEN
HOW OFTEN HAVE YOU FELT FED UP OR IRRITATED BECAUSE OF YOUR HEADACHES: VERY OFTEN
HOW OFTEN DID HEADACHS LIMIT CONCENTRATION ON WORK OR DAILY ACTIVITY: ALWAYS
HIT6 TOTAL SCORE: 72

## 2024-11-19 NOTE — PROGRESS NOTES
Botulinum Toxin Procedure Note     Wade Pozo  [id number]     Ordering provider: Indira Wadsworth MD     The procedure was explained to the patient. Benefits of the treatment were discussed including headache and migraine reduction. Risks of the procedure were reviewed including but not limited to pain, bruising, bleeding, infection, weakness of muscles injected or those distal to injection. The patient voiced understanding of the risks and benefits. All questions answered and the patient consented to proceed.      Prior to receiving Botox injections the patient reported the following:  Baseline headache/migraine frequency: 30 days a month  Baseline headache/migraine duration: minutes to hours in length, more than 4 hours of pain in a day. Atypical facial pain felt to be migraine variant after being seen in facial pain clinic.  Associated migrainous features: light sensitivity, worse with movement     Previous treatment trials:gabapentin, amitriptyline, topiramate,acupuncture, chiropractor, naltrexone (actually made pain worse)      Last Botox procedure: 8/20/24  Current migraine frequency: Daily facial pain, made worse by chronic stress. Botox helps along with gabapentin cut pain down by 50%. Her glasses made it her pain worse she weights on the arms of glasses to pull weight off nose.      Functional improvements since starting botulinum toxin treatments: fewer missed days of work. Able to go to more social events. Able to care for her child. Able to take less gabapentin     Last Neurology office visit: 11/28/2023     A 200 unit vial of Botox was diluted with 4ml of 0.9% sodium chloride     Botox lot # and expiration date: See MAR for details  0.9% sodium chloride lot # and expiration date: See MAR for details     The following muscles were injected using a 30 gauge needle:  Procerus 1 site 5 units   2 sites (bilat) 10 units  Frontalis 4 sites (bilat) 20 units  Temporalis 8 sites (bilat) 40  units  Trapezius muscles 6 sites (bilat) 30 units  Cervical paraspinals (bilat) 4 sites 20 units  Occipitalis 6 sites (bilat) 30 units     A total of 155 units were injected, 45 units wasted.   The patient tolerated the injections well. I was present for and performed the entire procedure.      Indira Wadsworth MD

## 2024-11-19 NOTE — LETTER
11/19/2024       RE: Wade Pozo  [address]  [facility] [address]     Dear Colleague,    Thank you for referring your patient, Wade Pozo, to the [facility] [place] at [facility]. Please see a copy of my visit note below.    Botulinum Toxin Procedure Note     Wade Pozo  [id number]     Ordering provider: Indira Wadsworth MD     The procedure was explained to the patient. Benefits of the treatment were discussed including headache and migraine reduction. Risks of the procedure were reviewed including but not limited to pain, bruising, bleeding, infection, weakness of muscles injected or those distal to injection. The patient voiced understanding of the risks and benefits. All questions answered and the patient consented to proceed.      Prior to receiving Botox injections the patient reported the following:  Baseline headache/migraine frequency: 30 days a month  Baseline headache/migraine duration: minutes to hours in length, more than 4 hours of pain in a day. Atypical facial pain felt to be migraine variant after being seen in facial pain clinic.  Associated migrainous features: light sensitivity, worse with movement     Previous treatment trials:gabapentin, amitriptyline, topiramate,acupuncture, chiropractor, naltrexone (actually made pain worse)      Last Botox procedure: 8/20/24  Current migraine frequency: Daily facial pain, made worse by chronic stress. Botox helps along with gabapentin cut pain down by 50%. Her glasses made it her pain worse she weights on the arms of glasses to pull weight off nose.      Functional improvements since starting botulinum toxin treatments: fewer missed days of work. Able to go to more social events. Able to care for her child. Able to take less gabapentin     Last Neurology office visit: 11/28/2023     A 200 unit vial of Botox was diluted with 4ml of 0.9% sodium chloride     Botox  lot # and expiration date: See MAR for details  0.9% sodium chloride lot # and expiration date: See MAR for details     The following muscles were injected using a 30 gauge needle:  Procerus 1 site 5 units   2 sites (bilat) 10 units  Frontalis 4 sites (bilat) 20 units  Temporalis 8 sites (bilat) 40 units  Trapezius muscles 6 sites (bilat) 30 units  Cervical paraspinals (bilat) 4 sites 20 units  Occipitalis 6 sites (bilat) 30 units     A total of 155 units were injected, 45 units wasted.   The patient tolerated the injections well. I was present for and performed the entire procedure.      Indira Wadsworth MD         Again, thank you for allowing me to participate in the care of your patient.      Sincerely,    Indira Wadsworth MD

## 2025-01-05 ENCOUNTER — HEALTH MAINTENANCE LETTER (OUTPATIENT)
Age: 35
End: 2025-01-05

## 2025-02-11 ENCOUNTER — OFFICE VISIT (OUTPATIENT)
Dept: NEUROLOGY | Facility: CLINIC | Age: 35
End: 2025-02-11
Payer: COMMERCIAL

## 2025-02-11 DIAGNOSIS — G43.719 INTRACTABLE CHRONIC MIGRAINE WITHOUT AURA AND WITHOUT STATUS MIGRAINOSUS: Primary | ICD-10-CM

## 2025-02-11 NOTE — PROGRESS NOTES
Botulinum Toxin Procedure Note     Wade Pozo  [id number]     Ordering provider: Indira Wadsworth MD     The procedure was explained to the patient. Benefits of the treatment were discussed including headache and migraine reduction. Risks of the procedure were reviewed including but not limited to pain, bruising, bleeding, infection, weakness of muscles injected or those distal to injection. The patient voiced understanding of the risks and benefits. All questions answered and the patient consented to proceed.      Prior to receiving Botox injections the patient reported the following:  Baseline headache/migraine frequency: 30 days a month  Baseline headache/migraine duration: minutes to hours in length, more than 4 hours of pain in a day. Atypical facial pain felt to be migraine variant after being seen in facial pain clinic.  Associated migrainous features: light sensitivity, worse with movement     Previous treatment trials:gabapentin, amitriptyline, topiramate,acupuncture, chiropractor, naltrexone (actually made pain worse)      Last Botox procedure: 11/19/24  Current migraine frequency: Daily facial pain, made worse by chronic stress. Botox helps along with gabapentin cut pain down by 50% overall but has not resolved symptoms.  Stress making pain worse right now.      Functional improvements since starting botulinum toxin treatments: fewer missed days of work. Able to go to more social events. Able to care for her child. Able to take less gabapentin     Last Neurology office visit: 11/28/2023     A 200 unit vial of Botox was diluted with 4ml of 0.9% sodium chloride     Botox lot # and expiration date: See MAR for details  0.9% sodium chloride lot # and expiration date: See MAR for details     The following muscles were injected using a 30 gauge needle:  Procerus 1 site 5 units   2 sites (bilat) 10 units  Frontalis 4 sites (bilat) 20 units  Temporalis 8 sites (bilat) 40 units  Trapezius muscles 6  sites (bilat) 30 units  Cervical paraspinals (bilat) 4 sites 20 units  Occipitalis 6 sites (bilat) 30 units     A total of 155 units were injected, 45 units wasted.   The patient tolerated the injections well. I was present for and performed the entire procedure.      Indira Wadsworth MD

## 2025-02-11 NOTE — LETTER
2/11/2025       RE: Wade Pozo  [address]  [place] [address]     Dear Colleague,    Thank you for referring your patient, Wade Pozo, to the [facility] NEUROLOGY CL[facility] [place] at [facility]. Please see a copy of my visit note below.    Botulinum Toxin Procedure Note     Wade Pozo  [id number]     Ordering provider: Indira Wadsworth MD     The procedure was explained to the patient. Benefits of the treatment were discussed including headache and migraine reduction. Risks of the procedure were reviewed including but not limited to pain, bruising, bleeding, infection, weakness of muscles injected or those distal to injection. The patient voiced understanding of the risks and benefits. All questions answered and the patient consented to proceed.      Prior to receiving Botox injections the patient reported the following:  Baseline headache/migraine frequency: 30 days a month  Baseline headache/migraine duration: minutes to hours in length, more than 4 hours of pain in a day. Atypical facial pain felt to be migraine variant after being seen in facial pain clinic.  Associated migrainous features: light sensitivity, worse with movement     Previous treatment trials:gabapentin, amitriptyline, topiramate,acupuncture, chiropractor, naltrexone (actually made pain worse)      Last Botox procedure: 11/19/24  Current migraine frequency: Daily facial pain, made worse by chronic stress. Botox helps along with gabapentin cut pain down by 50% overall but has not resolved symptoms.  Stress making pain worse right now.      Functional improvements since starting botulinum toxin treatments: fewer missed days of work. Able to go to more social events. Able to care for her child. Able to take less gabapentin     Last Neurology office visit: 11/28/2023     A 200 unit vial of Botox was diluted with 4ml of 0.9% sodium chloride     Botox lot # and  expiration date: See MAR for details  0.9% sodium chloride lot # and expiration date: See MAR for details     The following muscles were injected using a 30 gauge needle:  Procerus 1 site 5 units   2 sites (bilat) 10 units  Frontalis 4 sites (bilat) 20 units  Temporalis 8 sites (bilat) 40 units  Trapezius muscles 6 sites (bilat) 30 units  Cervical paraspinals (bilat) 4 sites 20 units  Occipitalis 6 sites (bilat) 30 units     A total of 155 units were injected, 45 units wasted.   The patient tolerated the injections well. I was present for and performed the entire procedure.      Indira Wadsworth MD      Again, thank you for allowing me to participate in the care of your patient.      Sincerely,    Indira Wadsworth MD

## 2025-05-06 ENCOUNTER — OFFICE VISIT (OUTPATIENT)
Dept: NEUROLOGY | Facility: CLINIC | Age: 35
End: 2025-05-06
Payer: COMMERCIAL

## 2025-05-06 DIAGNOSIS — G43.719 INTRACTABLE CHRONIC MIGRAINE WITHOUT AURA AND WITHOUT STATUS MIGRAINOSUS: Primary | ICD-10-CM

## 2025-05-06 ASSESSMENT — HEADACHE IMPACT TEST (HIT 6)
WHEN YOU HAVE A HEADACHE HOW OFTEN DO YOU WISH YOU COULD LIE DOWN: RARELY
WHEN YOU HAVE HEADACHES HOW OFTEN IS THE PAIN SEVERE: RARELY
HOW OFTEN HAVE YOU FELT TOO TIRED TO WORK BECAUSE OF YOUR HEADACHES: RARELY
HOW OFTEN DID HEADACHS LIMIT CONCENTRATION ON WORK OR DAILY ACTIVITY: RARELY
HOW OFTEN DO HEADACHES LIMIT YOUR DAILY ACTIVITIES: RARELY
HIT6 TOTAL SCORE: 48
HOW OFTEN HAVE YOU FELT FED UP OR IRRITATED BECAUSE OF YOUR HEADACHES: RARELY

## 2025-05-06 NOTE — PROGRESS NOTES
Botulinum Toxin Procedure Note     Wade Pozo  [id number]     Ordering provider: Indira Wadsworth MD     The procedure was explained to the patient. Benefits of the treatment were discussed including headache and migraine reduction. Risks of the procedure were reviewed including but not limited to pain, bruising, bleeding, infection, weakness of muscles injected or those distal to injection. The patient voiced understanding of the risks and benefits. All questions answered and the patient consented to proceed.      Prior to receiving Botox injections the patient reported the following:  Baseline headache/migraine frequency: 30 days a month  Baseline headache/migraine duration: minutes to hours in length, more than 4 hours of pain in a day. Atypical facial pain felt to be migraine variant after being seen in facial pain clinic.  Associated migrainous features: light sensitivity, worse with movement     Previous treatment trials:gabapentin, amitriptyline, topiramate,acupuncture, chiropractor, naltrexone (actually made pain worse)      Last Botox procedure: 2/11/25  Current migraine frequency: Daily facial pain, made worse by chronic stress. Botox helps along with gabapentin cut pain down by 50% overall but has not resolved symptoms.  Stress better now b/c new job in March. She is taking less gabapentin and feeling more awake. Botox helps and as it wears off pain returns.      Functional improvements since starting botulinum toxin treatments: fewer missed days of work. Able to go to more social events. Able to care for her child. Able to take less gabapentin     Last Neurology office visit: 11/28/2023     A 200 unit vial of Botox was diluted with 4ml of 0.9% sodium chloride     Botox lot # and expiration date: See MAR for details  0.9% sodium chloride lot # and expiration date: See MAR for details     The following muscles were injected using a 30 gauge needle:  Procerus 1 site 5 units   2 sites (bilat)  10 units  Frontalis 4 sites (bilat) 20 units  Temporalis 8 sites (bilat) 40 units  Trapezius muscles 6 sites (bilat) 30 units  Cervical paraspinals (bilat) 4 sites 20 units  Occipitalis 6 sites (bilat) 30 units     A total of 155 units were injected, 45 units wasted.   The patient tolerated the injections well. I was present for and performed the entire procedure.      Indira Wadsworth MD

## 2025-05-06 NOTE — LETTER
5/6/2025       RE: Wade Pozo  [address]  [place] [facility] [address]     Dear Colleague,    Thank you for referring your patient, Wade Pozo, to the [facility]UROLOGY [facility] [place] at [facility]. Please see a copy of my visit note below.    Botulinum Toxin Procedure Note     Wade Pozo  [id number]     Ordering provider: Indira Wadsworth MD     The procedure was explained to the patient. Benefits of the treatment were discussed including headache and migraine reduction. Risks of the procedure were reviewed including but not limited to pain, bruising, bleeding, infection, weakness of muscles injected or those distal to injection. The patient voiced understanding of the risks and benefits. All questions answered and the patient consented to proceed.      Prior to receiving Botox injections the patient reported the following:  Baseline headache/migraine frequency: 30 days a month  Baseline headache/migraine duration: minutes to hours in length, more than 4 hours of pain in a day. Atypical facial pain felt to be migraine variant after being seen in facial pain clinic.  Associated migrainous features: light sensitivity, worse with movement     Previous treatment trials:gabapentin, amitriptyline, topiramate,acupuncture, chiropractor, naltrexone (actually made pain worse)      Last Botox procedure: 2/11/25  Current migraine frequency: Daily facial pain, made worse by chronic stress. Botox helps along with gabapentin cut pain down by 50% overall but has not resolved symptoms.  Stress better now b/c new job in March. She is taking less gabapentin and feeling more awake. Botox helps and as it wears off pain returns.      Functional improvements since starting botulinum toxin treatments: fewer missed days of work. Able to go to more social events. Able to care for her child. Able to take less gabapentin     Last Neurology office visit: 11/28/2023      A 200 unit vial of Botox was diluted with 4ml of 0.9% sodium chloride     Botox lot # and expiration date: See MAR for details  0.9% sodium chloride lot # and expiration date: See MAR for details     The following muscles were injected using a 30 gauge needle:  Procerus 1 site 5 units   2 sites (bilat) 10 units  Frontalis 4 sites (bilat) 20 units  Temporalis 8 sites (bilat) 40 units  Trapezius muscles 6 sites (bilat) 30 units  Cervical paraspinals (bilat) 4 sites 20 units  Occipitalis 6 sites (bilat) 30 units     A total of 155 units were injected, 45 units wasted.   The patient tolerated the injections well. I was present for and performed the entire procedure.      Indira Wadsworth MD       Again, thank you for allowing me to participate in the care of your patient.      Sincerely,    Indira Wadsworth MD

## 2025-08-05 ENCOUNTER — OFFICE VISIT (OUTPATIENT)
Dept: NEUROLOGY | Facility: CLINIC | Age: 35
End: 2025-08-05
Payer: COMMERCIAL

## 2025-08-05 DIAGNOSIS — G43.719 INTRACTABLE CHRONIC MIGRAINE WITHOUT AURA AND WITHOUT STATUS MIGRAINOSUS: ICD-10-CM

## 2025-08-05 DIAGNOSIS — R20.0 NUMBNESS IN FEET: Primary | ICD-10-CM

## 2025-08-05 DIAGNOSIS — R51.9 FACIAL PAIN: ICD-10-CM

## 2025-08-05 PROBLEM — M72.2 PLANTAR FASCIAL FIBROMATOSIS: Status: ACTIVE | Noted: 2025-08-05

## 2025-08-05 PROBLEM — Z83.3 FAMILY HISTORY OF TYPE 1 DIABETES MELLITUS: Status: ACTIVE | Noted: 2025-08-05

## 2025-08-05 PROBLEM — M24.572 EQUINUS CONTRACTURE OF LEFT ANKLE: Status: ACTIVE | Noted: 2025-08-05

## 2025-08-05 PROBLEM — F90.0 ATTENTION DEFICIT HYPERACTIVITY DISORDER (ADHD), PREDOMINANTLY INATTENTIVE TYPE: Status: ACTIVE | Noted: 2022-06-10

## 2025-08-05 PROBLEM — F33.8 SEASONAL AFFECTIVE DISORDER: Status: ACTIVE | Noted: 2022-06-10

## 2025-08-05 RX ORDER — FLUTICASONE PROPIONATE 50 MCG
SPRAY, SUSPENSION (ML) NASAL
COMMUNITY

## 2025-08-05 RX ORDER — GABAPENTIN 600 MG/1
1200 TABLET ORAL 3 TIMES DAILY
Qty: 180 TABLET | Refills: 3 | Status: SHIPPED | OUTPATIENT
Start: 2025-08-05

## 2025-08-05 RX ORDER — ETHYNODIOL DIACETATE AND ETHINYL ESTRADIOL 1 MG-35MCG
1 KIT ORAL DAILY
COMMUNITY

## 2025-08-05 RX ORDER — MELOXICAM 15 MG/1
15 TABLET ORAL
COMMUNITY
Start: 2025-05-28

## 2025-08-05 ASSESSMENT — HEADACHE IMPACT TEST (HIT 6)
HOW OFTEN DID HEADACHS LIMIT CONCENTRATION ON WORK OR DAILY ACTIVITY: VERY OFTEN
HIT6 TOTAL SCORE: 67
WHEN YOU HAVE HEADACHES HOW OFTEN IS THE PAIN SEVERE: VERY OFTEN
HOW OFTEN HAVE YOU FELT TOO TIRED TO WORK BECAUSE OF YOUR HEADACHES: SOMETIMES
HOW OFTEN DO HEADACHES LIMIT YOUR DAILY ACTIVITIES: VERY OFTEN
HOW OFTEN HAVE YOU FELT FED UP OR IRRITATED BECAUSE OF YOUR HEADACHES: VERY OFTEN
WHEN YOU HAVE A HEADACHE HOW OFTEN DO YOU WISH YOU COULD LIE DOWN: ALWAYS

## 2025-08-20 ENCOUNTER — OFFICE VISIT (OUTPATIENT)
Dept: NEUROLOGY | Facility: CLINIC | Age: 35
End: 2025-08-20
Attending: PSYCHIATRY & NEUROLOGY
Payer: COMMERCIAL

## 2025-08-20 DIAGNOSIS — R20.0 NUMBNESS IN FEET: ICD-10-CM

## 2025-08-20 PROCEDURE — 95886 MUSC TEST DONE W/N TEST COMP: CPT | Performed by: PSYCHIATRY & NEUROLOGY

## 2025-08-20 PROCEDURE — 95912 NRV CNDJ TEST 11-12 STUDIES: CPT | Performed by: PSYCHIATRY & NEUROLOGY

## 2025-08-21 ENCOUNTER — MYC MEDICAL ADVICE (OUTPATIENT)
Dept: NEUROLOGY | Facility: CLINIC | Age: 35
End: 2025-08-21
Payer: COMMERCIAL